# Patient Record
Sex: MALE | Race: WHITE | Employment: UNEMPLOYED | URBAN - METROPOLITAN AREA
[De-identification: names, ages, dates, MRNs, and addresses within clinical notes are randomized per-mention and may not be internally consistent; named-entity substitution may affect disease eponyms.]

---

## 2017-08-22 ENCOUNTER — HOSPITAL ENCOUNTER (OUTPATIENT)
Facility: CLINIC | Age: 52
Setting detail: OBSERVATION
Discharge: HOME | End: 2017-08-24
Attending: EMERGENCY MEDICINE | Admitting: HOSPITALIST
Payer: MEDICAID

## 2017-08-22 DIAGNOSIS — E11.9 NEW ONSET TYPE 2 DIABETES MELLITUS (H): Primary | ICD-10-CM

## 2017-08-22 DIAGNOSIS — R73.9 HYPERGLYCEMIA: ICD-10-CM

## 2017-08-22 LAB
ALBUMIN SERPL-MCNC: 3.7 G/DL (ref 3.4–5)
ALBUMIN UR-MCNC: NEGATIVE MG/DL
ALP SERPL-CCNC: 281 U/L (ref 40–150)
ALT SERPL W P-5'-P-CCNC: 140 U/L (ref 0–70)
ANION GAP SERPL CALCULATED.3IONS-SCNC: 11 MMOL/L (ref 3–14)
ANION GAP SERPL CALCULATED.3IONS-SCNC: 12 MMOL/L (ref 3–14)
APPEARANCE UR: CLEAR
AST SERPL W P-5'-P-CCNC: 71 U/L (ref 0–45)
BASOPHILS # BLD AUTO: 0 10E9/L (ref 0–0.2)
BASOPHILS NFR BLD AUTO: 0.2 %
BILIRUB SERPL-MCNC: 0.5 MG/DL (ref 0.2–1.3)
BILIRUB UR QL STRIP: NEGATIVE
BUN SERPL-MCNC: 8 MG/DL (ref 7–30)
BUN SERPL-MCNC: 9 MG/DL (ref 7–30)
CALCIUM SERPL-MCNC: 7.8 MG/DL (ref 8.5–10.1)
CALCIUM SERPL-MCNC: 9 MG/DL (ref 8.5–10.1)
CHLORIDE SERPL-SCNC: 90 MMOL/L (ref 94–109)
CHLORIDE SERPL-SCNC: 95 MMOL/L (ref 94–109)
CO2 SERPL-SCNC: 24 MMOL/L (ref 20–32)
CO2 SERPL-SCNC: 24 MMOL/L (ref 20–32)
COLOR UR AUTO: ABNORMAL
CREAT SERPL-MCNC: 0.59 MG/DL (ref 0.66–1.25)
CREAT SERPL-MCNC: 0.62 MG/DL (ref 0.66–1.25)
DIFFERENTIAL METHOD BLD: ABNORMAL
EOSINOPHIL # BLD AUTO: 0.1 10E9/L (ref 0–0.7)
EOSINOPHIL NFR BLD AUTO: 0.6 %
ERYTHROCYTE [DISTWIDTH] IN BLOOD BY AUTOMATED COUNT: 12 % (ref 10–15)
GFR SERPL CREATININE-BSD FRML MDRD: >90 ML/MIN/1.7M2
GFR SERPL CREATININE-BSD FRML MDRD: >90 ML/MIN/1.7M2
GLUCOSE BLDC GLUCOMTR-MCNC: 329 MG/DL (ref 70–99)
GLUCOSE BLDC GLUCOMTR-MCNC: 442 MG/DL (ref 70–99)
GLUCOSE BLDC GLUCOMTR-MCNC: >600 MG/DL (ref 70–99)
GLUCOSE SERPL-MCNC: 653 MG/DL (ref 70–99)
GLUCOSE SERPL-MCNC: 678 MG/DL (ref 70–99)
GLUCOSE UR STRIP-MCNC: >1000 MG/DL
HBA1C MFR BLD: >16 % (ref 4.3–6)
HCT VFR BLD AUTO: 38.5 % (ref 40–53)
HGB BLD-MCNC: 13.8 G/DL (ref 13.3–17.7)
HGB UR QL STRIP: NEGATIVE
IMM GRANULOCYTES # BLD: 0 10E9/L (ref 0–0.4)
IMM GRANULOCYTES NFR BLD: 0.5 %
KETONES BLD-SCNC: 1.1 MMOL/L (ref 0–0.6)
KETONES BLD-SCNC: 2.3 MMOL/L (ref 0–0.6)
KETONES BLD-SCNC: NORMAL MMOL/L (ref 0–0.6)
KETONES UR STRIP-MCNC: 10 MG/DL
LEUKOCYTE ESTERASE UR QL STRIP: NEGATIVE
LYMPHOCYTES # BLD AUTO: 2.6 10E9/L (ref 0.8–5.3)
LYMPHOCYTES NFR BLD AUTO: 31.7 %
MCH RBC QN AUTO: 30.9 PG (ref 26.5–33)
MCHC RBC AUTO-ENTMCNC: 35.8 G/DL (ref 31.5–36.5)
MCV RBC AUTO: 86 FL (ref 78–100)
MONOCYTES # BLD AUTO: 0.4 10E9/L (ref 0–1.3)
MONOCYTES NFR BLD AUTO: 4.9 %
MUCOUS THREADS #/AREA URNS LPF: PRESENT /LPF
NEUTROPHILS # BLD AUTO: 5.1 10E9/L (ref 1.6–8.3)
NEUTROPHILS NFR BLD AUTO: 62.1 %
NITRATE UR QL: NEGATIVE
NRBC # BLD AUTO: 0 10*3/UL
NRBC BLD AUTO-RTO: 0 /100
PH UR STRIP: 5.5 PH (ref 5–7)
PLATELET # BLD AUTO: 275 10E9/L (ref 150–450)
POTASSIUM SERPL-SCNC: 3.7 MMOL/L (ref 3.4–5.3)
POTASSIUM SERPL-SCNC: 4.3 MMOL/L (ref 3.4–5.3)
PROT SERPL-MCNC: 7.4 G/DL (ref 6.8–8.8)
RBC # BLD AUTO: 4.46 10E12/L (ref 4.4–5.9)
RBC #/AREA URNS AUTO: <1 /HPF (ref 0–2)
SODIUM SERPL-SCNC: 126 MMOL/L (ref 133–144)
SODIUM SERPL-SCNC: 130 MMOL/L (ref 133–144)
SOURCE: ABNORMAL
SP GR UR STRIP: 1.02 (ref 1–1.03)
SQUAMOUS #/AREA URNS AUTO: <1 /HPF (ref 0–1)
TSH SERPL DL<=0.005 MIU/L-ACNC: 0.95 MU/L (ref 0.4–4)
UROBILINOGEN UR STRIP-MCNC: NORMAL MG/DL (ref 0–2)
WBC # BLD AUTO: 8.2 10E9/L (ref 4–11)
WBC #/AREA URNS AUTO: 3 /HPF (ref 0–2)

## 2017-08-22 PROCEDURE — 96361 HYDRATE IV INFUSION ADD-ON: CPT

## 2017-08-22 PROCEDURE — 85025 COMPLETE CBC W/AUTO DIFF WBC: CPT | Performed by: EMERGENCY MEDICINE

## 2017-08-22 PROCEDURE — 96372 THER/PROPH/DIAG INJ SC/IM: CPT

## 2017-08-22 PROCEDURE — 80048 BASIC METABOLIC PNL TOTAL CA: CPT | Performed by: PHYSICIAN ASSISTANT

## 2017-08-22 PROCEDURE — 80053 COMPREHEN METABOLIC PANEL: CPT | Performed by: EMERGENCY MEDICINE

## 2017-08-22 PROCEDURE — 25000128 H RX IP 250 OP 636: Performed by: EMERGENCY MEDICINE

## 2017-08-22 PROCEDURE — G0378 HOSPITAL OBSERVATION PER HR: HCPCS

## 2017-08-22 PROCEDURE — 83036 HEMOGLOBIN GLYCOSYLATED A1C: CPT | Performed by: EMERGENCY MEDICINE

## 2017-08-22 PROCEDURE — 82010 KETONE BODYS QUAN: CPT | Performed by: EMERGENCY MEDICINE

## 2017-08-22 PROCEDURE — 36415 COLL VENOUS BLD VENIPUNCTURE: CPT | Performed by: PHYSICIAN ASSISTANT

## 2017-08-22 PROCEDURE — 96360 HYDRATION IV INFUSION INIT: CPT

## 2017-08-22 PROCEDURE — 25000131 ZZH RX MED GY IP 250 OP 636 PS 637: Performed by: PHYSICIAN ASSISTANT

## 2017-08-22 PROCEDURE — 99220 ZZC INITIAL OBSERVATION CARE,LEVL III: CPT | Performed by: PHYSICIAN ASSISTANT

## 2017-08-22 PROCEDURE — 84443 ASSAY THYROID STIM HORMONE: CPT | Performed by: EMERGENCY MEDICINE

## 2017-08-22 PROCEDURE — 99285 EMERGENCY DEPT VISIT HI MDM: CPT | Mod: 25

## 2017-08-22 PROCEDURE — 83516 IMMUNOASSAY NONANTIBODY: CPT | Performed by: EMERGENCY MEDICINE

## 2017-08-22 PROCEDURE — 00000146 ZZHCL STATISTIC GLUCOSE BY METER IP

## 2017-08-22 PROCEDURE — 81001 URINALYSIS AUTO W/SCOPE: CPT | Performed by: EMERGENCY MEDICINE

## 2017-08-22 PROCEDURE — 82010 KETONE BODYS QUAN: CPT | Mod: 91 | Performed by: PHYSICIAN ASSISTANT

## 2017-08-22 RX ORDER — DEXTROSE MONOHYDRATE 25 G/50ML
25-50 INJECTION, SOLUTION INTRAVENOUS
Status: DISCONTINUED | OUTPATIENT
Start: 2017-08-22 | End: 2017-08-24 | Stop reason: HOSPADM

## 2017-08-22 RX ORDER — NALOXONE HYDROCHLORIDE 0.4 MG/ML
.1-.4 INJECTION, SOLUTION INTRAMUSCULAR; INTRAVENOUS; SUBCUTANEOUS
Status: DISCONTINUED | OUTPATIENT
Start: 2017-08-22 | End: 2017-08-24 | Stop reason: HOSPADM

## 2017-08-22 RX ORDER — POLYETHYLENE GLYCOL 3350 17 G/17G
17 POWDER, FOR SOLUTION ORAL DAILY PRN
Status: DISCONTINUED | OUTPATIENT
Start: 2017-08-22 | End: 2017-08-24 | Stop reason: HOSPADM

## 2017-08-22 RX ORDER — NICOTINE POLACRILEX 4 MG
15-30 LOZENGE BUCCAL
Status: DISCONTINUED | OUTPATIENT
Start: 2017-08-22 | End: 2017-08-24 | Stop reason: HOSPADM

## 2017-08-22 RX ORDER — ONDANSETRON 2 MG/ML
4 INJECTION INTRAMUSCULAR; INTRAVENOUS EVERY 6 HOURS PRN
Status: DISCONTINUED | OUTPATIENT
Start: 2017-08-22 | End: 2017-08-24 | Stop reason: HOSPADM

## 2017-08-22 RX ORDER — ONDANSETRON 4 MG/1
4 TABLET, ORALLY DISINTEGRATING ORAL EVERY 6 HOURS PRN
Status: DISCONTINUED | OUTPATIENT
Start: 2017-08-22 | End: 2017-08-24 | Stop reason: HOSPADM

## 2017-08-22 RX ORDER — AMOXICILLIN 250 MG
1-2 CAPSULE ORAL 2 TIMES DAILY PRN
Status: DISCONTINUED | OUTPATIENT
Start: 2017-08-22 | End: 2017-08-24 | Stop reason: HOSPADM

## 2017-08-22 RX ORDER — LIDOCAINE 40 MG/G
CREAM TOPICAL
Status: DISCONTINUED | OUTPATIENT
Start: 2017-08-22 | End: 2017-08-24 | Stop reason: HOSPADM

## 2017-08-22 RX ORDER — SODIUM CHLORIDE 9 MG/ML
1000 INJECTION, SOLUTION INTRAVENOUS CONTINUOUS
Status: DISCONTINUED | OUTPATIENT
Start: 2017-08-22 | End: 2017-08-23

## 2017-08-22 RX ADMIN — SODIUM CHLORIDE 1000 ML: 9 INJECTION, SOLUTION INTRAVENOUS at 11:44

## 2017-08-22 RX ADMIN — INSULIN ASPART 8 UNITS: 100 INJECTION, SOLUTION INTRAVENOUS; SUBCUTANEOUS at 18:20

## 2017-08-22 RX ADMIN — INSULIN GLARGINE 10 UNITS: 100 INJECTION, SOLUTION SUBCUTANEOUS at 17:15

## 2017-08-22 RX ADMIN — SODIUM CHLORIDE 1000 ML: 9 INJECTION, SOLUTION INTRAVENOUS at 12:28

## 2017-08-22 RX ADMIN — SODIUM CHLORIDE 1000 ML: 9 INJECTION, SOLUTION INTRAVENOUS at 17:16

## 2017-08-22 ASSESSMENT — ENCOUNTER SYMPTOMS
APPETITE CHANGE: 0
NAUSEA: 0
UNEXPECTED WEIGHT CHANGE: 1
VOMITING: 0
FREQUENCY: 1
DIARRHEA: 0
DYSURIA: 1
ABDOMINAL PAIN: 0
NUMBNESS: 1

## 2017-08-22 NOTE — IP AVS SNAPSHOT
[facility]    [address] [place] [id number]    Phone:  [phone]                                       After Visit Summary   8/22/2017    David Santos    MRN: [id number]           After Visit Summary Signature Page     I have received my discharge instructions, and my questions have been answered. I have discussed any challenges I see with this plan with the nurse or doctor.    ..........................................................................................................................................  Patient/Patient Representative Signature      ..........................................................................................................................................  Patient Representative Print Name and Relationship to Patient    ..................................................               ................................................  Date                                            Time    ..........................................................................................................................................  Reviewed by Signature/Title    ...................................................              ..............................................  Date                                                            Time

## 2017-08-22 NOTE — PHARMACY-ADMISSION MEDICATION HISTORY
Admission medication history interview status for the 8/22/2017  admission is complete. See EPIC admission navigator for prior to admission medications     Medication history source reliability:Good    Actions taken by pharmacist (provider contacted, etc): Spoke with patient     Additional medication history information not noted on PTA med list :None    Medication reconciliation/reorder completed by provider prior to medication history? Yes    Time spent in this activity: 5 minutes    Prior to Admission medications    Not on File

## 2017-08-22 NOTE — ED NOTES
[facility]  ED Nurse Handoff Report    ED Chief complaint: Weight Loss (drastic weight loss (50lbs+) over the last 3 months. patient states he doesn't have any specific symptoms and is eating food. )      ED Diagnosis:   Final diagnoses:   None       Code Status: Full Code    Allergies:   Allergies   Allergen Reactions     Vicodin [Hydrocodone-Acetaminophen] Nausea and Vomiting     Vomiting and sweats       Activity level - Baseline/Home:  Independent    Activity Level - Current:   Independent     Needed?: No    Isolation: No  Infection: Not Applicable    Bariatric?: No    Vital Signs:   Vitals:    08/22/17 1009   BP: 133/86   Pulse: 77   Resp: 14   Temp: 97.9  F (36.6  C)   TempSrc: Oral   SpO2: 99%   Weight: 83.2 kg (183 lb 6.4 oz)   Height: 1.829 m (6')       Cardiac Rhythm: ,        Pain level:      Is this patient confused?: No    Patient Report: Initial Complaint: weight loss  Focused Assessment: pt arrived to ED today with friend who has not seen pt for past few months as 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 Friend was concerned about pt weight loss that seemed a little drastic to friend. Pt has no other symptoms except numbness to the bottom of the left foot and voiding frequently. Labs were drawn, with glucose of over 600 and A1C of greater than 16.0. Pt admits to drinking, using meth and smoking marijuana.   Tests Performed: labs  Abnormal Results: see lab results, elevated glucose, ketones, A1C  Treatments provided: fluids 2L    Family Comments: none, friend went to work    OBS brochure/video discussed/provided to patient: No    ED Medications:   Medications   0.9% sodium chloride infusion (0 mLs Intravenous Stopped 8/22/17 1311)   0.9% sodium chloride BOLUS (0 mLs Intravenous Stopped 8/22/17 1226)       Drips infusing?:  No      ED NURSE PHONE NUMBER: [phone]

## 2017-08-22 NOTE — IP AVS SNAPSHOT
MRN:[id number]                      After Visit Summary   8/22/2017    David Santos    MRN: [id number]           Thank you!     Thank you for choosing [facility] for your care. Our goal is always to provide you with excellent care. Hearing back from our patients is one way we can continue to improve our services. Please take a few minutes to complete the written survey that you may receive in the mail after you visit with us. Thank you!        Patient Information     Date Of Birth          1965        About your hospital stay     You were admitted on:  August 22, 2017 You last received care in the:  [facility]    You were discharged on:  August 24, 2017        Reason for your hospital stay       You were here for evaluation of weight loss. Your blood sugar was found to be significantly elevated leading to a new diagnosis of diabetes.                  Who to Call     For medical emergencies, please call 911.  For non-urgent questions about your medical care, please call your primary care provider or clinic, [phone]          Attending Provider     Provider Specialty    Fabiano Osorio MD --    Tapan Marcum MD Internal Medicine       Primary Care Provider Office Phone # Fax #    Bella Isaac [phone]       When to contact your care team       Call your primary doctor if you have any of the following: blood sugar >500                  After Care Instructions     Activity       Your activity upon discharge: activity as tolerated            Diet       Follow this diet upon discharge: Orders Placed This Encounter      Moderate Consistent CHO Diet            Discharge Instructions       Continue the insulin regimen we started in the hospital. It is extremely important that you check your blood sugars four times daily and keep a log of this for your doctors. Check sugars prior to meals.     You will be sent home with glucose gel to be used if  your blood sugar drops too low    Please try and adhere to a moderate carbohydrate diet. Avoid simple sugars like candy, baked goods, soda as these can significantly raise your blood sugar     You should not take mealtime insulin unless you are eating                  Follow-up Appointments     Follow-up and recommended labs and tests        Follow up with Endocrinology (diabetes specialist) 8/25  Establish care with a new primary care provider next week as scheduled  Follow up with diabetes educator next week                  Your next 10 appointments already scheduled     Aug 25, 2017  1:00 PM CDT   (Arrive by 12:45 PM)   RETURN DIABETES with Chary Britt MD   [facility] ([facility])    [address]  3rd Floor  [facility] [address] [phone]            Aug 28, 2017 10:30 AM CDT   [facility]    [address] [address] [phone]           Bring a current list of meds and any records pertaining to this visit. For Physicals, please bring immunization records and any forms needing to be filled out. Please arrive 10 minutes early to complete paperwork.            Sep 05, 2017  9:00 AM CDT   Diabetic Education with  DIABETIC ED RESOURCE   [facility]    [address] [place] [id number] [phone]              Additional Services     Diabetes Educator Referral       DIABETES SELF MANAGEMENT TRAINING (DSMT)      Your provider has referred you to Diabetes Education: [facility]: Diabetes Education - All [facility] [phone]   [email or web address]    Type of training and number of hours: New Diagnosis: Initial group DSMT - 10 hours.      Medicare covers: 10 hours of initial DSMT in 12 month period from the time of first  visit, plus 2 hours of follow-up DSMT annually, and additional hours as requested for insulin training.    Diabetes Type: Type 2 - On Insulin             Diabetes Co-Morbidities: hypoglycemia unawareness               A1C Goal:  <8.0       A1C is: Lab Results       Component                Value               Date                       A1C                      >16.0               08/22/2017              If an urgent visit is needed or A1C is above 12, Care Team to call the Diabetes Education Team at [phone] or send an In Basket message to the Diabetes Education Pool (P DIAB ED-PATIENT CARE).    Diabetes Education Topics: Comprehensive Knowledge Assessment and Instruction, Knowledge: Healthy Eating and Monitoring Blood Sugar, Blood glucose meter instruction  and Medication Start: Insulin:Special Educational Needs Requiring Individual DSMT: None       MEDICAL NUTRITION THERAPY (MNT) for Diabetes    Medical Nutrition Therapy with a Registered Dietitian can be provided in coordination with Diabetes Self-Management Training to assist in achieving optimal diabetes management.         Please be aware that coverage of these services is subject to the terms and limitations of your health insurance plan.  Call member services at your health plan to determine Diabetes Self-Management Training benefits and ask which blood glucose monitor brands are covered by your plan.      Please bring the following with you to your appointment:    (1)  List of current medications   (2)  List of Blood Glucose Monitor brands that are covered by your insurance plan  (3)  Blood Glucose Monitor and log book  (4)   Food records for the 3 days prior to your visit    The Certified Diabetes Educator may make diabetes medication adjustments per the CDE Protocol and Collaborative Practice Agreement.                  Pending Results     Date and Time Order Name Status Description    8/22/2017 1025 Glutamic acid decarboxylase antibody In process   "           Statement of Approval     Ordered          17 1015  I have reviewed and agree with all the recommendations and orders detailed in this document.  EFFECTIVE NOW     Approved and electronically signed by:  Maryana Varghese PA-C             Admission Information     Date & Time Provider Department Dept. Phone    2017 Tapan Marcum MD [facility] [phone]      Your Vitals Were     Blood Pressure Pulse Temperature Respirations Height Weight    126/71 (BP Location: Right arm) 77 97.8  F (36.6  C) (Oral) 16 1.829 m (6' 0.01\") 83 kg (182 lb 14.4 oz)    Pulse Oximetry BMI (Body Mass Index)                99% 24.8 kg/m2          [facility] Information     [facility] lets you send messages to your doctor, view your test results, renew your prescriptions, schedule appointments and more. To sign up, go to [email or web address] . Click on \"Log in\" on the left side of the screen, which will take you to the Welcome page. Then click on \"Sign up Now\" on the right side of the page.     You will be asked to enter the access code listed below, as well as some personal information. Please follow the directions to create your username and password.     Your access code is: [email or web address]  Expires: 2017 12:46 PM     Your access code will  in 90 days. If you need help or a new code, please call your [place] clinic or [phone].        Care EveryWhere ID     This is your Care EveryWhere ID. This could be used by other organizations to access your [place] medical records  [id number]        Equal Access to Services     [facility] : Hadluiz Cavazos, clinton ayala, raji arango. So [facility] [phone].    ATENCIÓN: Si habla español, tiene a maldonado disposición servicios gratuitos de asistencia lingüística. Llame al [phone].    We comply with applicable federal civil rights laws and [place] laws. We do not " discriminate on the basis of race, color, national origin, age, disability sex, sexual orientation or gender identity.               Review of your medicines      START taking        Dose / Directions    blood glucose calibration solution   Commonly known as:  no brand specified        Use to calibrate blood glucose monitor as directed.   Quantity:  1 each   Refills:  0       blood glucose lancets standard   Commonly known as:  no brand specified        Use to test blood sugar 4 times daily or as directed.   Quantity:  100 each   Refills:  11       blood glucose monitoring meter device kit        Use to test blood sugars 4 times daily or as directed.   Quantity:  1 kit   Refills:  0       blood glucose monitoring test strip   Commonly known as:  no brand specified        Use to test blood sugars 4 times daily or as directed   Quantity:  100 strip   Refills:  0       glucose 40 % Gel gel        Dose:  15-30 g   Take 15-30 g by mouth every 15 minutes as needed for low blood sugar   Quantity:  1 Tube   Refills:  0       * insulin aspart 100 UNIT/ML injection   Commonly known as:  NovoLOG PEN        Dose:  1-10 Units   Inject 1-10 Units Subcutaneous 3 times daily (before meals)   Quantity:  3 mL   Refills:  0       * insulin aspart 100 UNIT/ML injection   Commonly known as:  NovoLOG PEN        Dose:  1-7 Units   Inject 1-7 Units Subcutaneous At Bedtime   Quantity:  3 mL   Refills:  0       * insulin aspart 100 UNIT/ML injection   Commonly known as:  NovoLOG PEN        Dose:  7 Units   Inject 7 Units Subcutaneous 3 times daily (with meals)   Quantity:  3 mL   Refills:  0       insulin glargine 100 UNIT/ML injection   Commonly known as:  LANTUS        Dose:  20 Units   Inject 20 Units Subcutaneous At Bedtime   Quantity:  3 mL   Refills:  0       * Notice:  This list has 3 medication(s) that are the same as other medications prescribed for you. Read the directions carefully, and ask your doctor or other care provider to  review them with you.         Where to get your medicines      These medications were sent to [facility] [place], [place] - [address]  [address] [place] [address]     Phone:  [phone]     blood glucose calibration solution    blood glucose lancets standard    blood glucose monitoring meter device kit    blood glucose monitoring test strip    glucose 40 % Gel gel    insulin aspart 100 UNIT/ML injection    insulin aspart 100 UNIT/ML injection    insulin aspart 100 UNIT/ML injection    insulin glargine 100 UNIT/ML injection                Protect others around you: Learn how to safely use, store and throw away your medicines at www.disposemymeds.org.             Medication List: This is a list of all your medications and when to take them. Check marks below indicate your daily home schedule. Keep this list as a reference.      Medications           Morning Afternoon Evening Bedtime As Needed    blood glucose calibration solution   Commonly known as:  no brand specified   Use to calibrate blood glucose monitor as directed.   Next Dose Due:  As directed                                blood glucose lancets standard   Commonly known as:  no brand specified   Use to test blood sugar 4 times daily or as directed.   Next Dose Due:  At dinner and at bedtime today                                            blood glucose monitoring meter device kit   Use to test blood sugars 4 times daily or as directed.   Next Dose Due:  At dinner and at bedtime tonight                                            blood glucose monitoring test strip   Commonly known as:  no brand specified   Use to test blood sugars 4 times daily or as directed   Next Dose Due:  At dinner and at bedtime tonight                                            glucose 40 % Gel gel   Take 15-30 g by mouth every 15 minutes as needed for low blood sugar   Next Dose Due:  When blood sugar < 60 or you have symptoms of low blood sugar                                 * insulin aspart 100 UNIT/ML injection   Commonly known as:  NovoLOG PEN   Inject 1-10 Units Subcutaneous 3 times daily (before meals)   Last time this was given:  17 Units on 8/24/2017 12:53 PM                                * insulin aspart 100 UNIT/ML injection   Commonly known as:  NovoLOG PEN   Inject 1-7 Units Subcutaneous At Bedtime   Last time this was given:  17 Units on 8/24/2017 12:53 PM                                * insulin aspart 100 UNIT/ML injection   Commonly known as:  NovoLOG PEN   Inject 7 Units Subcutaneous 3 times daily (with meals)   Last time this was given:  17 Units on 8/24/2017 12:53 PM                                insulin glargine 100 UNIT/ML injection   Commonly known as:  LANTUS   Inject 20 Units Subcutaneous At Bedtime   Last time this was given:  20 Units on 8/23/2017 10:33 PM   Next Dose Due:  At bedtime tonight                                 * Notice:  This list has 3 medication(s) that are the same as other medications prescribed for you. Read the directions carefully, and ask your doctor or other care provider to review them with you.

## 2017-08-22 NOTE — PLAN OF CARE
Observation goals PRIOR TO DISCHARGE     Comments: Insulin regimen in place:  Partially met  Blood sugars controlled: Not met  Follow up in place: Not met   Blood sugar> 300, education initiated, DM education pamphlet provided.  Continue to monitor closely

## 2017-08-22 NOTE — H&P
PRIMARY CARE PHYSICIAN:  None given.      DATE OF SERVICE:  08/22/2017      CHIEF COMPLAINT:  Weight loss.      HISTORY OF PRESENT ILLNESS:  David Santos is a 52-year-old male with a past medical history significant for tobacco use and history of elevated LFTs who presented to the Emergency Department today for evaluation of weight loss.  The patient reports that, over the past 3 months, his weight has dropped from 240 pounds to 184 pounds, though his diet has not changed much.  His friends are becoming very concerned about his weight and encouraged him to seek medical attention.  He reports, that over the past 3 months, he has been experiencing fatigue, polyuria and polydipsia.  He also notes bilateral numbness and tingling in his feet.  He was evaluated in the Emergency Department by Dr. Osorio.  His blood sugar was found to be markedly elevated at 653.  Hemoglobin A1c was checked and found to be greater than 16.  Additionally, his laboratory evaluation was notable for a sodium of 126 which corrects to 133, as well as elevated LFTs.  He was given a 2 liter fluid bolus and admission for new onset diabetes was requested.      The patient is presently evaluated in the Emergency Department by me.  He reports he is feeling okay at the time of my exam.  Again, his primary reason for coming in was for evaluation of weight loss.  He denies any chest pain, shortness of breath, nausea, vomiting or abdominal pain.  He denies any recent bleeding or bruising.  Denies recent illnesses or injuries.  He does note he has had significant thirst as well as polyuria over the past couple of months.  He has not seen a medical provider in over 10 years.      REVIEW OF SYSTEMS:  A 10-point review of systems was conducted and is negative aside from the information in the HPI.      PAST MEDICAL HISTORY:   1.  Tobacco use.   2.  History of methamphetamine and marijuana abuse.   3.  History of colitis.   4.  History of elevated LFTs  noted in documentation in 2007.      PAST SURGICAL HISTORY:  Reviewed and not contributory.      ALLERGIES:  Vicodin.      SOCIAL HISTORY:  Patient has a history of heavy alcohol use.  Reports that, until about 3 months ago, he was drinking 1-1.5 bottles of liquor (1.75 liters) per week.  He has cut down over the past couple of months.  Reports that he drinks 4-5 beers a couple times a week.  Denies being a nightly drinker.  He denies any history of alcohol withdrawal or seizure.  He has a history of drug abuse including marijuana and meth.  Denies any IV drug use.  His last methamphetamine use was approximately 1 month prior.  He is a current smoker and is currently using a vaporizer.  He has been smoking on and off, about a pack a day, for 30 years.  He is employed intermittently as a .      FAMILY HISTORY:  Sister has history of type 2 diabetes and his brother has a history of heart disease with an MI in his 40s.      LABORATORY DATA:  Sodium 126, potassium 4.3, chloride 90, CO2 24, BUN 9, creatinine 0.59, total bili 0.5, alk phos 281,  and AST is 71.  His hemoglobin A1c is greater than 16.  TSH normal at 0.95.  Ketones are elevated at 2.3 and initial blood sugar was 653.  Subsequent check at 442.  His white blood cell count is 8.2, hemoglobin 13.8, hematocrit 38.5 and platelets are 275.  Urinalysis is notable for glucose, no evidence of infection.      PHYSICAL EXAMINATION:   VITAL SIGNS:  Temperature 97.9, heart rate 77, blood pressure 133/86, respiratory rate 14, oxygen saturation is 99% on room air.   GENERAL:  Alert and oriented male sitting up in bed, appears comfortable and is appropriately conversant, flat affect.   HEENT:  Eyes:  Pupils are equal and reactive to light, EOMI.   ENT:  Mucous membranes are moist, poor dentition.   CARDIOVASCULAR:  Regular rate and rhythm, no murmurs appreciated.   RESPIRATORY:  Lungs are clear to auscultation bilaterally.  No increased work of  breathing or wheezing.   GASTROINTESTINAL:  Positive bowel sounds.  Abdomen with mild tenderness to palpation diffusely.   SKIN:  Warm and dry.   NEUROLOGIC:  Cranial nerves II-XII are grossly intact.  No focal deficits.      ASSESSMENT:  David Santos is a 52-year-old male with a past medical history significant for tobacco use, substance abuse and a history of elevated liver function tests who presented to the Emergency Department for evaluation of weight loss and fatigue.  He is being admitted under observation status for further evaluation and treatment of new onset type 2 diabetes mellitus.   1.  New onset type 2 diabetes mellitus.  Patient had not been seen by a medical provider in over 10 years.  He reports 3 months of significant weight loss, fatigue, increased urination and polydipsia.  His blood sugar is elevated on admission at 653 with an elevated A1c of greater than 16.  Notably, his anion gap was within normal limits so he is not in diabetic ketoacidosis.  Ketones are mildly elevated at 2.3.  A long discussion had with patient regarding the diagnosis of diabetes and initiation of insulin, he is agreeable to try this and agreeable to follow up.   -- Will start Lantus 10 units once daily.   -- Medium intensity sliding scale insulin, patient should be checking his own blood sugar, administering insulin injections.   -- Moderate carbohydrate diet.   -- Care coordinator consult to facilitate primary care physician appointment to establish care as well as Endocrinology appointment.  Patient will eventually need a complete foot exam and    Ophthalmology examination.     -- Arrange diabetic education as an outpatient.   3.  Elevated liver function tests, unclear chronicity.  The patient has not been seen by a medical provider since 2007, though his transaminases were elevated back in 2007 as k  Denies intravenous drug use.   -- Will obtain abdominal ultrasound.   -- Will need further followup as an outpatient.    4.  Code Status:  Patient is full code.      DISPOSITION:  Anticipate discharge tomorrow when blood sugar is under better control.  Outpatient followup, as arranged, and insulin regimen in place.      Patient was seen and examined with Dr. Sharon Marcum who agrees with the above plan.         SHARON MARCUM MD       As dictated by TIFFANI SHELBY PA-C            D: 2017 18:07   T: 2017 18:46   MT: TD      Name:     VÍCTOR QUEZADA   MRN:      [id number]        Account:      [id number]   :      1965           Admitted:     [id number]      Document: [id number]

## 2017-08-23 ENCOUNTER — APPOINTMENT (OUTPATIENT)
Dept: ULTRASOUND IMAGING | Facility: CLINIC | Age: 52
End: 2017-08-23
Attending: PHYSICIAN ASSISTANT
Payer: MEDICAID

## 2017-08-23 LAB
ALBUMIN SERPL-MCNC: 2.8 G/DL (ref 3.4–5)
ALP SERPL-CCNC: 212 U/L (ref 40–150)
ALT SERPL W P-5'-P-CCNC: 113 U/L (ref 0–70)
ANION GAP SERPL CALCULATED.3IONS-SCNC: 7 MMOL/L (ref 3–14)
AST SERPL W P-5'-P-CCNC: 64 U/L (ref 0–45)
BILIRUB SERPL-MCNC: 0.5 MG/DL (ref 0.2–1.3)
BUN SERPL-MCNC: 7 MG/DL (ref 7–30)
CALCIUM SERPL-MCNC: 8 MG/DL (ref 8.5–10.1)
CHLORIDE SERPL-SCNC: 103 MMOL/L (ref 94–109)
CO2 SERPL-SCNC: 26 MMOL/L (ref 20–32)
CREAT SERPL-MCNC: 0.68 MG/DL (ref 0.66–1.25)
GFR SERPL CREATININE-BSD FRML MDRD: >90 ML/MIN/1.7M2
GLUCOSE BLDC GLUCOMTR-MCNC: 250 MG/DL (ref 70–99)
GLUCOSE BLDC GLUCOMTR-MCNC: 299 MG/DL (ref 70–99)
GLUCOSE BLDC GLUCOMTR-MCNC: 340 MG/DL (ref 70–99)
GLUCOSE BLDC GLUCOMTR-MCNC: 422 MG/DL (ref 70–99)
GLUCOSE BLDC GLUCOMTR-MCNC: 464 MG/DL (ref 70–99)
GLUCOSE SERPL-MCNC: 314 MG/DL (ref 70–99)
POTASSIUM SERPL-SCNC: 3.6 MMOL/L (ref 3.4–5.3)
PROT SERPL-MCNC: 5.9 G/DL (ref 6.8–8.8)
SODIUM SERPL-SCNC: 136 MMOL/L (ref 133–144)

## 2017-08-23 PROCEDURE — 25000128 H RX IP 250 OP 636: Performed by: EMERGENCY MEDICINE

## 2017-08-23 PROCEDURE — 96372 THER/PROPH/DIAG INJ SC/IM: CPT | Mod: 59

## 2017-08-23 PROCEDURE — 36415 COLL VENOUS BLD VENIPUNCTURE: CPT | Performed by: PHYSICIAN ASSISTANT

## 2017-08-23 PROCEDURE — 00000146 ZZHCL STATISTIC GLUCOSE BY METER IP

## 2017-08-23 PROCEDURE — 25000131 ZZH RX MED GY IP 250 OP 636 PS 637: Performed by: PHYSICIAN ASSISTANT

## 2017-08-23 PROCEDURE — 76700 US EXAM ABDOM COMPLETE: CPT

## 2017-08-23 PROCEDURE — 80053 COMPREHEN METABOLIC PANEL: CPT | Performed by: PHYSICIAN ASSISTANT

## 2017-08-23 PROCEDURE — 99226 ZZC SUBSEQUENT OBSERVATION CARE,LEVEL III: CPT | Performed by: PHYSICIAN ASSISTANT

## 2017-08-23 PROCEDURE — G0378 HOSPITAL OBSERVATION PER HR: HCPCS

## 2017-08-23 PROCEDURE — 96361 HYDRATE IV INFUSION ADD-ON: CPT

## 2017-08-23 RX ORDER — BLOOD-GLUCOSE METER
EACH MISCELLANEOUS
Qty: 1 KIT | Refills: 0 | Status: SHIPPED | OUTPATIENT
Start: 2017-08-23

## 2017-08-23 RX ORDER — BLOOD-GLUCOSE CONTROL, NORMAL
EACH MISCELLANEOUS
Qty: 1 EACH | Refills: 0 | Status: SHIPPED | OUTPATIENT
Start: 2017-08-23 | End: 2017-10-04

## 2017-08-23 RX ADMIN — INSULIN ASPART 9 UNITS: 100 INJECTION, SOLUTION INTRAVENOUS; SUBCUTANEOUS at 08:43

## 2017-08-23 RX ADMIN — INSULIN GLARGINE 20 UNITS: 100 INJECTION, SOLUTION SUBCUTANEOUS at 22:33

## 2017-08-23 RX ADMIN — INSULIN ASPART 5 UNITS: 100 INJECTION, SOLUTION INTRAVENOUS; SUBCUTANEOUS at 11:59

## 2017-08-23 RX ADMIN — SODIUM CHLORIDE 1000 ML: 9 INJECTION, SOLUTION INTRAVENOUS at 01:10

## 2017-08-23 RX ADMIN — INSULIN ASPART 10 UNITS: 100 INJECTION, SOLUTION INTRAVENOUS; SUBCUTANEOUS at 16:34

## 2017-08-23 NOTE — PLAN OF CARE
Observation goals PRIOR TO DISCHARGE     Comments: Insulin regimen in place:  Partially met  Blood sugars controlled: Not met  Follow up in place: Not met   Blood sugar> 600.  7 Unit of Novolog insulin was given, Naeem Alcantara was notified.  Stat BMP, 10 units of Novolog given in addition given, recheck blood sugar at 0200. Pt is totally asymptomatic except polyuria.  Report to night RN to continue to monitor closely.  Pt admitted that he did drink 1/2 bottle of diet mountain dew from vending machine.  Instructed pt about his diet restriction.

## 2017-08-23 NOTE — PLAN OF CARE
Problem: Individualization  Goal: Patient Preferences  Outcome: No Change  Observation Goals  Insulin regimen in place -no  Blood sugars controlled -no  Follow up in place-no

## 2017-08-23 NOTE — PROVIDER NOTIFICATION
MD Notification    Notified Person:  MD    Notified Persons Name: Maryana Justin    Notification Date/Time: 8/23/17 1612    Notification Interaction:  Talked with Physician    Purpose of Notification:     Orders Received: will be started on mealtime coverage along with SSI    Comments:

## 2017-08-23 NOTE — PLAN OF CARE
Problem: Goal Outcome Summary  Goal: Goal Outcome Summary  Outcome: No Change  PRIMARY DIAGNOSIS: HYPO/HYPERGLYCEMIA     OUTPATIENT/OBSERVATION GOALS TO BE MET BEFORE DISCHARGE  1. BG greater than 100 and less than 250 on two consecutive readings: No  Recent Labs   Lab Test  08/23/17   0821  08/23/17   0216  08/22/17 2123   BGM  340*  299*  >600*          2. Ketones absent from urine  (hyperglycemia): No      3. Tolerating oral intake to maintain hydration: Yes      4. Return to near baseline physical activity: Yes     Discharge Planner Nurse   Safe discharge environment identified: Yes  Barriers to discharge: Yes       Entered by: Venice Osborne 08/23/2017 10:24 AM     Please review provider order for any additional goals.   Nurse to notify provider when observation goals have been met and patient is ready for discharge.

## 2017-08-23 NOTE — PROGRESS NOTES
[facility]    Hospitalist Progress Note      Assessment & Plan   David Santos is a 52 year old male with a past medical history significant for tobacco use, substance abuse and a history of elevated liver function tests who presented to the Emergency Department for evaluation of weight loss and fatigue.  He is being admitted under observation status for further evaluation and treatment of new onset type 2 diabetes mellitus.     1.  New onset type 2 diabetes mellitus.  Patient had not been seen by a medical provider in over 10 years.  He reports 3 months of significant weight loss, fatigue, increased urination and polydipsia.  His blood sugar was elevated on admission at 653 with an elevated A1c of greater than 16.  Notably, his anion gap was within normal limits so he was not in diabetic ketoacidosis.  A long discussion had with patient regarding the diagnosis of diabetes and initiation of insulin, he is agreeable to try this and agreeable to follow up. Blood sugar this morning improving, 340 am, 250 before lunch.   -- Increase Lantus to 20  -- High intensity sliding scale; patient should check his own sugars and administer his own injections  -- 5m NovoLog with meals added 8/23  -- Moderate carbohydrate diet.   -- PCP appointment has been made to establish care on 8/28 and appointment with diabetes educator later that week.     2.  Elevated liver function tests, unclear chronicity.  The patient has not been seen by a medical provider since 2007, though his transaminases were elevated back in 2007.  Denies intravenous drug use.   -- abdominal ultrasound unremarkable  -- should be rechecked in follow up, consider hepatitis testing    DVT Prophylaxis: Ambulate every shift  Code Status: Full Code    Disposition: Expected discharge 8/24    This patient was seen and examined with Dr. Lr who agrees with the above plan.    HARSH Vo-ARSALAN    Interval History   Patient is feeling well today.  Some mild lower diffuse abdominal discomfort this morning which has since resolved. Denies CP, SOB. Feels comfortable with glucose. Reports last night he made a trip to the vending machine for junk food before his 600+ blood sugar.     -Data reviewed today: I reviewed all new labs and imaging results over the last 24 hours. I personally reviewed no images or EKG's today.    Physical Exam   Temp: 98  F (36.7  C) Temp src: Oral BP: 122/72   Heart Rate: 59 Resp: 16 SpO2: 98 % O2 Device: None (Room air)    Vitals:    08/22/17 1009 08/22/17 1603   Weight: 83.2 kg (183 lb 6.4 oz) 83 kg (182 lb 14.4 oz)     Vital Signs with Ranges  Temp:  [98  F (36.7  C)-98.9  F (37.2  C)] 98  F (36.7  C)  Heart Rate:  [59-79] 59  Resp:  [16] 16  BP: (122-130)/(66-81) 122/72  SpO2:  [97 %-98 %] 98 %  I/O last 3 completed shifts:  In: 1625.83 [P.O.:880; I.V.:745.83]  Out: 2520 [Urine:2520]    GENERAL:  Alert and oriented male sitting up in bed, appears comfortable and is appropriately conversant, flat affect.   HEENT:  Eyes:  Pupils are equal and reactive to light, EOMI.   ENT:  Mucous membranes are moist, poor dentition.   CARDIOVASCULAR:  Regular rate and rhythm, no murmurs appreciated.   RESPIRATORY:  Lungs are clear to auscultation bilaterally.  No increased work of breathing or wheezing.   GASTROINTESTINAL:  Positive bowel sounds.  Abdomen with mild tenderness to palpation diffusely.   SKIN:  Warm and dry.   NEUROLOGIC:  Cranial nerves II-XII are grossly intact.  No focal deficits.      Medications     NaCl 1,000 mL (08/23/17 0110)       insulin aspart  5 Units Subcutaneous QAM AC     insulin glargine  15 Units Subcutaneous At Bedtime     sodium chloride (PF)  3 mL Intracatheter Q8H     insulin aspart  1-10 Units Subcutaneous TID AC     insulin aspart  1-7 Units Subcutaneous At Bedtime       Data     Recent Labs  Lab 08/23/17  0636 08/22/17  2145 08/22/17  1025   WBC  --   --  8.2   HGB  --   --  13.8   MCV  --   --  86   PLT  --   --   275    130* 126*   POTASSIUM 3.6 3.7 4.3   CHLORIDE 103 95 90*   CO2 26 24 24   BUN 7 8 9   CR 0.68 0.62* 0.59*   ANIONGAP 7 11 12   JAMIA 8.0* 7.8* 9.0   * 678* 653*   ALBUMIN 2.8*  --  3.7   PROTTOTAL 5.9*  --  7.4   BILITOTAL 0.5  --  0.5   ALKPHOS 212*  --  281*   *  --  140*   AST 64*  --  71*       Recent Results (from the past 24 hour(s))   US Abdomen Complete    Narrative    ULTRASOUND  ABDOMEN COMPLETE   8/23/2017 7:58 AM     HISTORY: Elevated liver function test.    COMPARISON: CT 5/30/2007    FINDINGS: Visualized portions of the pancreas, aorta, and IVC are  unremarkable. The liver is normal in size and echogenicity. No biliary  dilatation or liver mass. The gallbladder is normal. No gallstones.  The common bile duct measures 3 mm. Right kidney measures 13.7 cm in  length and appears normal. The spleen is normal in size and  appearance. The left kidney measures 14.1 cm in length. There is an  exophytic cyst arising from the upper pole of the left kidney that  measures up to 3.9 cm in diameter.      Impression    IMPRESSION: The liver and gallbladder appear normal.    RONNY APODACA MD

## 2017-08-23 NOTE — PLAN OF CARE
Problem: Goal Outcome Summary  Goal: Goal Outcome Summary  Outcome: Improving  PRIMARY DIAGNOSIS: HYPO/HYPERGLYCEMIA     OUTPATIENT/OBSERVATION GOALS TO BE MET BEFORE DISCHARGE  1. BG greater than 100 and less than 250 on two consecutive readings: No  Recent Labs   Lab Test  08/23/17   1144  08/23/17   0821  08/23/17   0216   BGM  250*  340*  299*          2. Ketones absent from urine  (hyperglycemia): Yes      3. Tolerating oral intake to maintain hydration: Yes      4. Return to near baseline physical activity: Yes     Discharge Planner Nurse   Safe discharge environment identified: Yes  Barriers to discharge: No       Entered by: Venice Osborne 08/23/2017 2:56 PM     Please review provider order for any additional goals.   Nurse to notify provider when observation goals have been met and patient is ready for discharge.

## 2017-08-23 NOTE — PLAN OF CARE
Problem: Individualization  Goal: Patient Preferences  Outcome: No Change  Observation Goals  Insulin regimen in place -no  Blood sugars controlled -no  Follow up in place-no  Pt A&O, VSS, pt up independently. BS at 0200 was 299.  Pt needs more DM education.  Pt in denial about being a diabetic.  Will continue to monitor.

## 2017-08-23 NOTE — PROVIDER NOTIFICATION
MD Notification    Notified Person:  MD    Notified Persons Name:    Notification Date/Time:2130    Notification Interaction:  Talked with Physician/HARSH Alcantara    Purpose of Notification: Blood sugar >600, 7 Unit of Novolog insulin given    Orders Received: Stat BMP obtained, Blood sugar 678, 10 Unit of Novolog insulin is ordered and given.  Recheck at 0200    Comments:

## 2017-08-23 NOTE — PROGRESS NOTES
hospitalist cross-cover: paged regarding bedside finger blood glucose value of >600, stat BMP drawn with glucose 678, remains unremarkable without anion gap suggestive of DKA. Will administer additional 10u novolog insulin and check glucose values q4h overnight.    Naeem Alcantara PA-C  8/22/2017, 10:20 PM  Pager: [phone]

## 2017-08-24 VITALS
TEMPERATURE: 97.8 F | RESPIRATION RATE: 16 BRPM | HEART RATE: 77 BPM | WEIGHT: 182.9 LBS | OXYGEN SATURATION: 99 % | DIASTOLIC BLOOD PRESSURE: 71 MMHG | HEIGHT: 72 IN | SYSTOLIC BLOOD PRESSURE: 126 MMHG | BODY MASS INDEX: 24.77 KG/M2

## 2017-08-24 LAB
GAD65 AB SER IA-ACNC: <5 IU/ML (ref 0–5)
GLUCOSE BLDC GLUCOMTR-MCNC: 276 MG/DL (ref 70–99)
GLUCOSE BLDC GLUCOMTR-MCNC: 321 MG/DL (ref 70–99)
GLUCOSE BLDC GLUCOMTR-MCNC: 397 MG/DL (ref 70–99)

## 2017-08-24 PROCEDURE — 99207 ZZC CDG-CODE INCORRECT PER BILLING BASED ON TIME: CPT | Performed by: PHYSICIAN ASSISTANT

## 2017-08-24 PROCEDURE — 00000146 ZZHCL STATISTIC GLUCOSE BY METER IP

## 2017-08-24 PROCEDURE — 96372 THER/PROPH/DIAG INJ SC/IM: CPT

## 2017-08-24 PROCEDURE — G0378 HOSPITAL OBSERVATION PER HR: HCPCS

## 2017-08-24 PROCEDURE — 99217 ZZC OBSERVATION CARE DISCHARGE: CPT | Performed by: PHYSICIAN ASSISTANT

## 2017-08-24 RX ORDER — NICOTINE POLACRILEX 4 MG
15-30 LOZENGE BUCCAL
Qty: 1 TUBE | Refills: 0 | Status: SHIPPED | OUTPATIENT
Start: 2017-08-24

## 2017-08-24 RX ADMIN — INSULIN ASPART 6 UNITS: 100 INJECTION, SOLUTION INTRAVENOUS; SUBCUTANEOUS at 08:56

## 2017-08-24 RX ADMIN — INSULIN ASPART 10 UNITS: 100 INJECTION, SOLUTION INTRAVENOUS; SUBCUTANEOUS at 12:53

## 2017-08-24 NOTE — PLAN OF CARE
Problem: Discharge Planning  Goal: Discharge Planning (Adult, OB, Behavioral, Peds)  Outcome: No Change   PRIOR TO DISCHARGE     Comments: Insulin regimen in place working on goal  Blood sugars controlled not met HS   Follow up in place: Goal met

## 2017-08-24 NOTE — PROGRESS NOTES
Care Coordinator met wt pt to discuss DC planning and F/U appts.  Pt's [place] health [place] was just [place]; his reference # is [id number]. Pt's goal is to DC home.  He states he lives wt his friends Ralph and Johana.  He also has a goof friend Monty who is very helpful and supportive; Monty will pick him up today and take him home.  Pt is a new diabetic.  He has appts wt Endocrinology tomorrow and Diabetic nurse educator Sept. 5th.  He will also F/U wt his new PCP on 8/28.  Pt is aware of these appts and states he will go to his appts.  Pt has diabetic supplies to take home today.  He states he's had diabetic/ insulin self injection teaching here at the hospital.  He states he has no other concerns at DC.  Pt to Dc today.

## 2017-08-24 NOTE — PLAN OF CARE
Problem: Goal Outcome Summary  Goal: Goal Outcome Summary  Outcome: Improving  PRIMARY DIAGNOSIS: HYPO/HYPERGLYCEMIA     OUTPATIENT/OBSERVATION GOALS TO BE MET BEFORE DISCHARGE  1. BG greater than 100 and less than 250 on two consecutive readings: No  Recent Labs   Lab Test  08/23/17   1608  08/23/17   1144  08/23/17   0821   BGM  464*  250*  340*          2. Ketones absent from urine  (hyperglycemia): Yes      3. Tolerating oral intake to maintain hydration: Yes      4. Return to near baseline physical activity: Yes     Discharge Planner Nurse   Safe discharge environment identified: Yes  Barriers to discharge: No       Entered by: Venice Osborne 08/23/2017 7:29 PM     Please review provider order for any additional goals.   Nurse to notify provider when observation goals have been met and patient is ready for discharge.     Pt A&Ox4. VSS on room air. Up independently in room and halls throughout shift. Complains of mid/lower abdominal pain this morning, but denies need for intervention. No complaints of SOB. Lung sounds clear. Tolerating diabetic diet. , 250, and 464 with appropriate coverage. Started on meal time coverage along with SSI coverage. Adequate output with small bowel movement this shift. Abdomen distended and slightly firm. US abdomen done and negative. Started diabetes education. Plan to discharge home tomorrow. Nursing will continue to monitor.

## 2017-08-24 NOTE — PLAN OF CARE
Problem: Discharge Planning  Goal: Discharge Planning (Adult, OB, Behavioral, Peds)  Outcome: No Change   PRIOR TO DISCHARGE     Comments: Insulin regimen in place working on goal: partially met    Blood sugars controlled and improved after 20 units Lantus at bedtime last night. At Lunch >300  Follow up in place: partially met       Follow up care needed to be scheduled, Follow up with DM education as outpatient. Care coordinator consult for follow up care as outpatient.

## 2017-08-24 NOTE — PLAN OF CARE
Problem: Discharge Planning  Goal: Discharge Planning (Adult, OB, Behavioral, Peds)  Outcome: No Change   PRIOR TO DISCHARGE     Comments: Insulin regimen in place working on goal  Blood sugars controlled not met  Follow up in place: Goal met

## 2017-08-24 NOTE — PLAN OF CARE
Problem: Goal Outcome Summary  Goal: Goal Outcome Summary  Outcome: Adequate for Discharge Date Met:  08/24/17  Patient demonstrated use of glucose meter and using lancet for blood drop. DC instructions reviewed with patient including follow up appointments and carbohydrate education. DC supplies/meds given to patient and reviewed including side effects/administration. All questions answered. Patient verbalizes understanding of DC care. Patient aware of endocrinologist appointment for tomorrow.

## 2017-08-24 NOTE — PLAN OF CARE
Problem: Discharge Planning  Goal: Discharge Planning (Adult, OB, Behavioral, Peds)  Outcome: No Change   PRIOR TO DISCHARGE     Comments: Insulin regimen in place working on goal  Blood sugars controlled and improved after 20 units Lantus at bedtime last night  Follow up in place: Goal met

## 2017-08-24 NOTE — PLAN OF CARE
Problem: Goal Outcome Summary  Goal: Goal Outcome Summary  Outcome: No Change  A&O. VSS. Denies pain. Lantus increased from 10U to 20U. HS blood sugar 422, 0200 blood sugar 321. C/o bilat LE numbness. Pt. Has been administering insulin and checking blood sugars with supervision.

## 2017-08-25 ENCOUNTER — TELEPHONE (OUTPATIENT)
Dept: ENDOCRINOLOGY | Facility: CLINIC | Age: 52
End: 2017-08-25

## 2017-08-25 ENCOUNTER — CARE COORDINATION (OUTPATIENT)
Dept: CARE COORDINATION | Facility: CLINIC | Age: 52
End: 2017-08-25

## 2017-08-25 NOTE — TELEPHONE ENCOUNTER
----- Message from Denise Post MD sent at 8/25/2017  4:51 PM CDT -----  Regarding: FW: Patient cancelled on call appt today   lst avilable    ----- Message -----     From: Jessa Robertson MA     Sent: 8/25/2017   1:18 PM       To: Denise Post MD  Subject: Patient cancelled on call appt today             Patient was schedule with Chary Britt today after hospital discharge for new diabetes. He cancelled 5 minutes before appt. And needs to be reschedule. Are we scheduling next avail or in consult week again?     Thanks  Jessa

## 2017-08-25 NOTE — PROGRESS NOTES
Clinic Care Coordination Contact  OUTREACH    Referral Information:  Referral Source: [facility]  Reason for Contact: Hospitalization 8/22-8/24/2017-Hyperglycemia Newly diagnosed diabetic   Care Conference: No     Universal Utilization:   ED Visits in last year: 0  Hospital visits in last year: 1  Last PCP appointment: 05/23/17     Concerns: No  Multiple Providers or Specialists:  (No)    Clinic Care Coordination Contact  [facility]/Voicemail    Referral Source: CTS  Clinical Data: Care Coordinator Outreach  Outreach attempted x 1.  Left message on voicemail with call back information and requested return call.  Plan:  Care Coordinator will try to reach patient again in 1-2 business days.  Isha Aviles RN / Clinical Care Coordinator     [address] [address]  [email or web address]  Office :  [phone] / Fax :  [phone]

## 2017-08-26 ENCOUNTER — HEALTH MAINTENANCE LETTER (OUTPATIENT)
Age: 52
End: 2017-08-26

## 2017-08-28 ENCOUNTER — TELEPHONE (OUTPATIENT)
Dept: INTERNAL MEDICINE | Facility: CLINIC | Age: 52
End: 2017-08-28

## 2017-08-28 ENCOUNTER — OFFICE VISIT (OUTPATIENT)
Dept: INTERNAL MEDICINE | Facility: CLINIC | Age: 52
End: 2017-08-28
Payer: MEDICAID

## 2017-08-28 ENCOUNTER — CARE COORDINATION (OUTPATIENT)
Dept: CARE COORDINATION | Facility: CLINIC | Age: 52
End: 2017-08-28

## 2017-08-28 VITALS
HEART RATE: 84 BPM | DIASTOLIC BLOOD PRESSURE: 62 MMHG | OXYGEN SATURATION: 97 % | SYSTOLIC BLOOD PRESSURE: 118 MMHG | BODY MASS INDEX: 26.22 KG/M2 | TEMPERATURE: 98.8 F | HEIGHT: 72 IN | WEIGHT: 193.6 LBS

## 2017-08-28 DIAGNOSIS — R63.4 UNINTENTIONAL WEIGHT LOSS: ICD-10-CM

## 2017-08-28 DIAGNOSIS — Z09 HOSPITAL DISCHARGE FOLLOW-UP: Primary | ICD-10-CM

## 2017-08-28 DIAGNOSIS — Z12.11 SCREENING FOR COLON CANCER: ICD-10-CM

## 2017-08-28 DIAGNOSIS — Z76.89 ENCOUNTER TO ESTABLISH CARE: ICD-10-CM

## 2017-08-28 DIAGNOSIS — Z79.4 TYPE 2 DIABETES MELLITUS WITHOUT COMPLICATION, WITH LONG-TERM CURRENT USE OF INSULIN (H): ICD-10-CM

## 2017-08-28 DIAGNOSIS — Z23 NEED FOR VACCINATION: ICD-10-CM

## 2017-08-28 DIAGNOSIS — E11.9 TYPE 2 DIABETES MELLITUS WITHOUT COMPLICATION, WITH LONG-TERM CURRENT USE OF INSULIN (H): ICD-10-CM

## 2017-08-28 LAB
CREAT UR-MCNC: 34 MG/DL
MICROALBUMIN UR-MCNC: <5 MG/L
MICROALBUMIN/CREAT UR: NORMAL MG/G CR (ref 0–17)

## 2017-08-28 PROCEDURE — 99207 C FOOT EXAM  NO CHARGE: CPT | Performed by: INTERNAL MEDICINE

## 2017-08-28 PROCEDURE — 90472 IMMUNIZATION ADMIN EACH ADD: CPT | Performed by: INTERNAL MEDICINE

## 2017-08-28 PROCEDURE — 99496 TRANSJ CARE MGMT HIGH F2F 7D: CPT | Mod: 25 | Performed by: INTERNAL MEDICINE

## 2017-08-28 PROCEDURE — 82043 UR ALBUMIN QUANTITATIVE: CPT | Performed by: INTERNAL MEDICINE

## 2017-08-28 PROCEDURE — 90715 TDAP VACCINE 7 YRS/> IM: CPT | Performed by: INTERNAL MEDICINE

## 2017-08-28 PROCEDURE — 90471 IMMUNIZATION ADMIN: CPT | Performed by: INTERNAL MEDICINE

## 2017-08-28 PROCEDURE — 90732 PPSV23 VACC 2 YRS+ SUBQ/IM: CPT | Performed by: INTERNAL MEDICINE

## 2017-08-28 RX ORDER — ASPIRIN 81 MG/1
81 TABLET ORAL DAILY
Qty: 30 TABLET | Refills: 11 | Status: SHIPPED | OUTPATIENT
Start: 2017-08-28

## 2017-08-28 RX ORDER — ATORVASTATIN CALCIUM 40 MG/1
40 TABLET, FILM COATED ORAL DAILY
Qty: 90 TABLET | Refills: 3 | Status: SHIPPED | OUTPATIENT
Start: 2017-08-28 | End: 2017-10-20

## 2017-08-28 NOTE — MR AVS SNAPSHOT
After Visit Summary   8/28/2017    David Santos    MRN: [id number]           Patient Information     Date Of Birth          1965        Visit Information        Provider Department      8/28/2017 10:30 AM Bella Isaac MD [facility]        Today's Diagnoses     Need for vaccination    -  1    New onset type 2 diabetes mellitus (H)        Screening for diabetes mellitus          Care Instructions    Pneumonia and tetanus vaccines today.    ---    Urine sample today.    ---    Increase Lantus to 30 units at night. New prescription sent to pharmacy.    Increase Novolog with meals to 15 units with meals (+ the sliding scale). New prescription sent to pharmacy.    START atorvastatin 40mg daily. New prescription sent to pharmacy.    START daily baby aspirin 81mg. New prescription sent to pharmacy.    ---    I have placed a ophthalmology referral for you.    Please schedule an appointment at your earliest convenience - phone number below.     ---    You will be contacted to schedule the colonoscopy.     ---    Follow-up with me in 1-2 weeks to review blood sugars.                Follow-ups after your visit        Additional Services     GASTROENTEROLOGY ADULT REF PROCEDURE ONLY       Last Lab Result: Creatinine (mg/dL)       Date                     Value                 08/23/2017               0.68             ----------  Body mass index is 26.26 kg/(m^2).      Patient will be contacted to schedule procedure.     Please be aware that coverage of these services is subject to the terms and limitations of your health insurance plan.  Call member services at your health plan with any benefit or coverage questions.  Any procedures must be performed at a [place] facility OR coordinated by your clinic's referral office.    Please bring the following with you to your appointment:    (1) Any X-Rays, CTs or MRIs which have been performed.  Contact the facility where they were done  to arrange for  prior to your scheduled appointment.    (2) List of current medications   (3) This referral request   (4) Any documents/labs given to you for this referral            OPHTHALMOLOGY ADULT REFERRAL       Your provider has referred you to: [facility] [phone] [email or web address]    Please be aware that coverage of these services is subject to the terms and limitations of your health insurance plan.  Call member services at your health plan with any benefit or coverage questions.      Please bring the following with you to your appointment:    (1) Any X-Rays, CTs or MRIs which have been performed.  Contact the facility where they were done to arrange for  prior to your scheduled appointment.    (2) List of current medications  (3) This referral request   (4) Any documents/labs given to you for this referral                  Your next 10 appointments already scheduled     Sep 05, 2017  9:00 AM CDT   Diabetic Education with  DIABETIC ED RESOURCE   [facility]    [address] [place] [id number] [phone]              Who to contact     If you have questions or need follow up information about today's clinic visit or your schedule please contact [facility] directly at [phone].  Normal or non-critical lab and imaging results will be communicated to you by MyChart, letter or phone within 4 business days after the clinic has received the results. If you do not hear from us within 7 days, please contact the clinic through MyChart or phone. If you have a critical or abnormal lab result, we will notify you by phone as soon as possible.  Submit refill requests through [employer] or call your pharmacy and they will forward the refill request to us. Please allow 3 business days for your refill to be completed.          Additional Information About Your  "Visit        Flashpointhart Information     exsulin lets you send messages to your doctor, view your test results, renew your prescriptions, schedule appointments and more. To sign up, go to [email or web address] . Click on \"Log in\" on the left side of the screen, which will take you to the Welcome page. Then click on \"Sign up Now\" on the right side of the page.     You will be asked to enter the access code listed below, as well as some personal information. Please follow the directions to create your username and password.     Your access code is: [email or web address]  Expires: 2017 12:46 PM     Your access code will  in 90 days. If you need help or a new code, please call your [place] clinic or [phone].        Care EveryWhere ID     This is your Care EveryWhere ID. This could be used by other organizations to access your [place] medical records  [id number]        Your Vitals Were     Pulse Temperature Height Pulse Oximetry BMI (Body Mass Index)       84 98.8  F (37.1  C) (Oral) 6' (1.829 m) 97% 26.26 kg/m2        Blood Pressure from Last 3 Encounters:   17 118/62   17 126/71   07 108/64    Weight from Last 3 Encounters:   17 193 lb 9.6 oz (87.8 kg)   17 182 lb 14.4 oz (83 kg)   07 237 lb 9.6 oz (107.8 kg)              We Performed the Following     Albumin Random Urine Quantitative with Creat Ratio     GASTROENTEROLOGY ADULT REF PROCEDURE ONLY     OPHTHALMOLOGY ADULT REFERRAL     Pneumococcal vaccine 23 valent PPSV23  (Pneumovax) [59556]     TDAP VACCINE (ADACEL) [66304.002]     TDAP VACCINE (ADACEL)     VACCINE ADMINISTRATION, INITIAL          Today's Medication Changes          These changes are accurate as of: 17 11:16 AM.  If you have any questions, ask your nurse or doctor.               Start taking these medicines.        Dose/Directions    aspirin EC 81 MG EC tablet   Used for:  New onset type 2 diabetes mellitus (H)   Started by:  Bella Isaac MD     "    Dose:  81 mg   Take 1 tablet (81 mg) by mouth daily   Quantity:  30 tablet   Refills:  11       atorvastatin 40 MG tablet   Commonly known as:  LIPITOR   Used for:  New onset type 2 diabetes mellitus (H)   Started by:  Bella Isaac MD        Dose:  40 mg   Take 1 tablet (40 mg) by mouth daily   Quantity:  90 tablet   Refills:  3         These medicines have changed or have updated prescriptions.        Dose/Directions    * insulin aspart 100 UNIT/ML injection   Commonly known as:  NovoLOG PEN   This may have changed:  Another medication with the same name was changed. Make sure you understand how and when to take each.   Used for:  New onset type 2 diabetes mellitus (H)        Dose:  1-10 Units   Inject 1-10 Units Subcutaneous 3 times daily (before meals)   Quantity:  3 mL   Refills:  0       * insulin aspart 100 UNIT/ML injection   Commonly known as:  NovoLOG PEN   This may have changed:  Another medication with the same name was changed. Make sure you understand how and when to take each.   Used for:  New onset type 2 diabetes mellitus (H)        Dose:  1-7 Units   Inject 1-7 Units Subcutaneous At Bedtime   Quantity:  3 mL   Refills:  0       * insulin aspart 100 UNIT/ML injection   Commonly known as:  NovoLOG PEN   This may have changed:  how much to take   Used for:  New onset type 2 diabetes mellitus (H)   Changed by:  Bella Isaac MD        Dose:  15 Units   Inject 15 Units Subcutaneous 3 times daily (with meals)   Quantity:  15 mL   Refills:  0       insulin glargine 100 UNIT/ML injection   Commonly known as:  LANTUS   This may have changed:  how much to take   Used for:  New onset type 2 diabetes mellitus (H)   Changed by:  Bella Isaac MD        Dose:  30 Units   Inject 30 Units Subcutaneous At Bedtime   Quantity:  9 mL   Refills:  0       * Notice:  This list has 3 medication(s) that are the same as other medications prescribed for you. Read the directions carefully, and ask your  doctor or other care provider to review them with you.         Where to get your medicines      These medications were sent to [id number] - [place], [place] - [address] [facility]  [address] [facility] [address]     Phone:  [phone]     aspirin EC 81 MG EC tablet    atorvastatin 40 MG tablet    insulin aspart 100 UNIT/ML injection    insulin glargine 100 UNIT/ML injection                Primary Care Provider Office Phone # Fax #    Bella Isaac [phone]       [address] [facility] [address]        Equal Access to Services     [facility]: Hadii aad ku hadasho Soomaali, waaxda luqadaha, qaybta kaalmada adeegyada, waxkourtney idiin hayaan adeeg amador gordon . So [facility] [phone].    ATENCIÓN: Si habla español, tiene a maldonado disposición servicios gratuitos de asistencia lingüística. [facility] [phone].    We comply with applicable federal civil rights laws and [place] laws. We do not discriminate on the basis of race, color, national origin, age, disability sex, sexual orientation or gender identity.            Thank you!     Thank you for choosing [facility]  for your care. Our goal is always to provide you with excellent care. Hearing back from our patients is one way we can continue to improve our services. Please take a few minutes to complete the written survey that you may receive in the mail after your visit with us. Thank you!             Your Updated Medication List - Protect others around you: Learn how to safely use, store and throw away your medicines at www.disposemymeds.org.          This list is accurate as of: 8/28/17 11:16 AM.  Always use your most recent med list.                   Brand Name Dispense Instructions for use Diagnosis    aspirin EC 81 MG EC tablet     30 tablet    Take 1 tablet (81 mg) by mouth daily    New onset type 2 diabetes mellitus (H)       atorvastatin 40 MG tablet    LIPITOR    90 tablet    Take 1  tablet (40 mg) by mouth daily    New onset type 2 diabetes mellitus (H)       blood glucose calibration solution    no brand specified    1 each    Use to calibrate blood glucose monitor as directed.    New onset type 2 diabetes mellitus (H)       blood glucose lancets standard    no brand specified    100 each    Use to test blood sugar 4 times daily or as directed.    New onset type 2 diabetes mellitus (H)       blood glucose monitoring meter device kit     1 kit    Use to test blood sugars 4 times daily or as directed.    New onset type 2 diabetes mellitus (H)       blood glucose monitoring test strip    no brand specified    100 strip    Use to test blood sugars 4 times daily or as directed    New onset type 2 diabetes mellitus (H)       glucose 40 % Gel gel     1 Tube    Take 15-30 g by mouth every 15 minutes as needed for low blood sugar    New onset type 2 diabetes mellitus (H)       * insulin aspart 100 UNIT/ML injection    NovoLOG PEN    3 mL    Inject 1-10 Units Subcutaneous 3 times daily (before meals)    New onset type 2 diabetes mellitus (H)       * insulin aspart 100 UNIT/ML injection    NovoLOG PEN    3 mL    Inject 1-7 Units Subcutaneous At Bedtime    New onset type 2 diabetes mellitus (H)       * insulin aspart 100 UNIT/ML injection    NovoLOG PEN    15 mL    Inject 15 Units Subcutaneous 3 times daily (with meals)    New onset type 2 diabetes mellitus (H)       insulin glargine 100 UNIT/ML injection    LANTUS    9 mL    Inject 30 Units Subcutaneous At Bedtime    New onset type 2 diabetes mellitus (H)       * Notice:  This list has 3 medication(s) that are the same as other medications prescribed for you. Read the directions carefully, and ask your doctor or other care provider to review them with you.

## 2017-08-28 NOTE — NURSING NOTE
Chief Complaint   Patient presents with     Hospital F/U       Initial /62  Pulse 84  Temp 98.8  F (37.1  C) (Oral)  Ht 6' (1.829 m)  Wt 193 lb 9.6 oz (87.8 kg)  SpO2 97%  BMI 26.26 kg/m2 Estimated body mass index is 26.26 kg/(m^2) as calculated from the following:    Height as of this encounter: 6' (1.829 m).    Weight as of this encounter: 193 lb 9.6 oz (87.8 kg).  Medication Reconciliation: complete   Zhanna Daily MA     Screening Questionnaire for Adult Immunization    Are you sick today?   No   Do you have allergies to medications, food, a vaccine component or latex?   Yes   Have you ever had a serious reaction after receiving a vaccination?   No   Do you have a long-term health problem with heart disease, lung disease, asthma, kidney disease, metabolic disease (e.g. diabetes), anemia, or other blood disorder?   Yes   Do you have cancer, leukemia, HIV/AIDS, or any other immune system problem?   No   In the past 3 months, have you taken medications that affect  your immune system, such as prednisone, other steroids, or anticancer drugs; drugs for the treatment of rheumatoid arthritis, Crohn s disease, or psoriasis; or have you had radiation treatments?   No   Have you had a seizure, or a brain or other nervous system problem?   No   During the past year, have you received a transfusion of blood or blood     products, or been given immune (gamma) globulin or antiviral drug?   No   For women: Are you pregnant or is there a chance you could become        pregnant during the next month?   No   Have you received any vaccinations in the past 4 weeks?   No     Immunization questionnaire was positive for at least one answer.  Notified Dr Isaac.        Per orders of Dr. Isaac, injection of TDAP given by Zhanna Daily. Patient instructed to remain in clinic for 15 minutes afterwards, and to report any adverse reaction to me immediately.       Screening performed by Zhanna Daily on 8/28/2017 at 10:52  AM.

## 2017-08-28 NOTE — PATIENT INSTRUCTIONS
Pneumonia and tetanus vaccines today.    ---    Urine sample today.    ---    Increase Lantus to 30 units at night. New prescription sent to pharmacy.    Increase Novolog with meals to 15 units with meals (+ the sliding scale). New prescription sent to pharmacy.    START atorvastatin 40mg daily. New prescription sent to pharmacy.    START daily baby aspirin 81mg. New prescription sent to pharmacy.    ---    I have placed a ophthalmology referral for you.    Please schedule an appointment at your earliest convenience - phone number below.     ---    You will be contacted to schedule the colonoscopy.     ---    Follow-up with me in 1-2 weeks to review blood sugars.

## 2017-08-28 NOTE — TELEPHONE ENCOUNTER
Pharmacy called and they are needing clarification on the insulin glargine (LANTUS) 100 UNIT/ML injection they stated that if it is intended for the lantus solostar then this only comes in 3ml, but if it is intended for the lantus vial then it comes in 10ml. Please contact pharmacy with clarification or send in new rx to [id number] - [place], [place] - [address] [facility]    Telephone Fax   [phone]

## 2017-08-28 NOTE — PROGRESS NOTES
SUBJECTIVE:      David aSntos is a pleasant 52 year old male who presents for hospital follow-up, diabetes follow-up, and to establish care:    Hospital: [facility]  Date of Admission: 8/22/17  Date of Discharge: 8/24/17  Reason(s) for Admission: new onset diabetes    Diagnostic tests, treatments/interventions, and discharge summary reviewed.    Summary of hospitalization:  - prior to presentation patient had not seen a doctor in over 10 years  - presented to [facility] complaining of unintentional weight loss over the last 3 months  - endorsed fatigue, polyuria, and polydipsia  - endorsed bilateral numbness and tingling in his feet  - blood sugar found to be markedly elevated at 653 on presentation; anion gap within normal limits  - started on Lantus 20 units at night, 7 units of NovoLog with meals + high intensity sliding scale    Medication changes since discharge: None  Adherent to discharge medications: Yes  Problems taking discharge medications: No    Follow-up: n/a     Update since discharge:   - overall, feeling better than he did when he went to the hospital  - energy is improving over time  - appetite is good - eating well    - no abdominal pain, nausea, or vomiting  - no diarrhea or constipation  - no fevers or chills  - no chest pain or palpitations  - no shortness of breath or cough    DIABETES MANAGEMENT                                                      Current DM regimen: Lantus 20 units q.h.s., 7 units NovoLog with meals + sliding scale  Adherent to regimen: Yes  Adherent to ADA diet: No - has not been educated regarding ADA diet  Fasting BGs: 400s to >500  Pre-meal BGs: 400s to >500  Episodes of hypoglycemia: denies    Ophthalmology: DUE  Retinopathy: unknown  Neuropathy: yes - both feet   Nephropathy: Unknown  Checking feet/seeing podiatry: No  Influenza vaccine: n/a   Pneumococcal vaccine: DUE    Aspirin Rx: not on - should be   ACE-I/ARB: not on - not hypertensive and no urine microalbumin on  file  Statin: not on - should be    Last HgbA1c: >16 (8/22/17)  Last LDL: no cholesterol on file    ESTABLISH CARE                                                      PMH, PSH, FH, SH, medications, allergies, immunizations, and preventative health measures reviewed.     Past Medical History:   Diagnosis Date     Type 2 diabetes mellitus (H)      Past Surgical History:   Procedure Laterality Date     EYE SURGERY Left 1978    ? for strabismus     HAND SURGERY Bilateral 1979    unknown repairs after running into window and cutting hands     Family History   Problem Relation Age of Onset     Type 2 Diabetes Sister      x1 full sister; x 3 half-sisters     Myocardial Infarction Brother 50     CEREBROVASCULAR DISEASE No family hx of      Coronary Artery Disease Early Onset No family hx of      Prostate Cancer No family hx of      Colon Cancer No family hx of      Occupational History     Unemployed      Social History Main Topics     Smoking status: Current Some Day Smoker     Packs/day: 2.00     Years: 14.00     Smokeless tobacco: Never Used      Comment: uses vaporizer with tobacco, 6-7x/day     Alcohol use Yes      Comment: history of alcohol abuse; 1-2 beers/night     Drug use: No      Comment: marijuana use 2-3x/day; history of methamphetamine use (none since July, 2017)     Sexual activity: Not Currently     Social History Narrative    Dating.    Adult son.    Living with roommates.    Not formal exercise.      Allergies   Allergen Reactions     Vicodin [Hydrocodone-Acetaminophen] Nausea and Vomiting     Vomiting and sweats     Current Outpatient Prescriptions   Medication Sig     insulin glargine (LANTUS) 100 UNIT/ML injection Inject 20 Units Subcutaneous At Bedtime     insulin aspart (NOVOLOG PEN) 100 UNIT/ML injection Inject 7 Units Subcutaneous 3 times daily (with meals)     insulin aspart (NOVOLOG PEN) 100 UNIT/ML injection Inject 1-10 Units Subcutaneous 3 times daily (before meals)     insulin aspart (NOVOLOG  PEN) 100 UNIT/ML injection Inject 1-7 Units Subcutaneous At Bedtime     PREVENTATIVE HEALTH                                                      BMI: within normal limits   Blood pressure: within normal limits   Prostate Cancer Screening: did not discuss today  AAA screening: not medically indicated at this time   Colonoscopy: DUE  Screening HCV: not medically indicated at this time   STD testing: discussed with and declined by patient  Depression screening: PHQ-2 assessment completed and reviewed - no intervention indicated at this time  Alcohol misuse screening: alcohol use reviewed - no intervention indicated at this time  Immunizations: reviewed; tetanus booster and Pneumovax DUE    OBJECTIVE:       /62  Pulse 84  Temp 98.8  F (37.1  C) (Oral)  Ht 6' (1.829 m)  Wt 193 lb 9.6 oz (87.8 kg)  SpO2 97%  BMI 26.26 kg/m2  Constitutional: well-appearing  Respiratory: normal respiratory effort; clear to auscultation bilaterally  Cardiovascular: regular rate and rhythm; pedal pulses palpable; no edema  Gastrointestinal: soft, non-tender, non-distended, and bowel sounds present; no organomegaly or masses   Musculoskeletal: normal gait and station; feet intact - no lesions; sensation intact to light touch   Psych: normal judgment and insight; normal mood and affect; recent and remote memory intact    ASSESSMENT/PLAN:       (Z09) Hospital discharge follow-up  (primary encounter diagnosis)  (R63.4) Unintentional weight loss  (E11.9,  Z79.4) Type 2 diabetes mellitus without complication, with long-term current use of insulin (H)  Comment: diabetes continues to be poorly controlled.  Plan:   - INCREASE Lantus to 30 units q.h.s.   - INCREASE NovoLog to 15 units with meals.   - CONTINUE high intensity sliding scale.   - urine microalbumin today.   - START atorvastatin 40 mg daily.   - START aspirin 81 mg daily.   - referred to ophthalmology for routine DM exam - patient to schedule.   - follow-up in 1-2 weeks  max.    - will consider starting metformin at that time.    - will refer to diabetes educator at that time.    (Z76.89) Encounter to establish care  Comment: PMH, PSH, FH, SH, medications, allergies, immunizations, and preventative health measures reviewed.   Plan:   - can discuss prostate cancer screening at next visit.   - see below for remainder plans.    (Z23) Need for vaccination  Plan: TDAP and Pneumovax given today.    (Z12.11) Screening for colon cancer  Plan: colonoscopy ordered - patient will be contacted to schedule.  The instructions on the AVS were discussed and explained to the patient. Patient expressed understanding of instructions.    (Chart documentation was completed, in part, with [employer] voice-recognition software. Even though reviewed, some grammatical, spelling, and word errors may remain.)    Bella Isaac MD   [address] [address]  T: [phone], F: [phone]

## 2017-08-28 NOTE — LETTER
08/28/17      David Santos  [address] [facility] [address]        Dear ,    It was a pleasure meeting you the other day! I hope this finds you well.    I wanted to let you know that your urine lab came back as normal (no kidney damage from diabetes).     See you soon to review blood sugars at the higher insulin doses.    Please feel free to contact me with any questions or concerns.      Take care,    Bella Isaac MD   [place] [address] [address] [place] [address]  T: [phone], F: [phone]

## 2017-08-28 NOTE — LETTER
[facility]  Complex Care Plan  About Me  Patient Name:  David Santos    YOB: 1965  Age:   52 year old   [place] MRN: [id number] Telephone Information:     Home Phone [phone]   Mobile [phone]       Address:    [address]  [facility] [address] Email address:  No e-mail address on record      Emergency Contact(s)  Name Relationship Lgl Grd Work Phone Home Phone Mobile Phone   1. TACHO MARY Friend   [phone]    2. YONIS SCRUGGS Friend   [phone]           Primary language:  English     needed? No   [place] [facility]:  [phone] op. 1  Other communication barriers: No  Preferred Method of Communication:  Letter, Phone  Current living arrangement:    Mobility Status/ Medical Equipment: Independent  Other information to know about me:    Health Maintenance  Health Maintenance Reviewed: Due/Overdue not assessed     My Access Plan  Medical Emergency 911   Primary Clinic Line [facility]- [phone]   24 Hour Appointment Line [phone] or  [phone]) (toll-free)   24 Hour Nurse Line [phone] (toll-free)   Preferred Urgent Care [facility], [phone]   Preferred Hospital [facility]  [phone]   Preferred Pharmacy [id number] - [place], [place] - [address]     Behavioral Health Crisis Line The National Suicide Prevention Lifeline at 1-936.989.4915 or 911     My Care Team Members  Patient Care Team       Relationship Specialty Notifications Start End    Bella Isaac MD PCP - General Internal Medicine  8/24/17     Phone: [phone] Fax: [phone]         [address] [facility] [address]         My Care Plans  Self Management and Treatment Plan  Goals and (Comments)  Goal #1: I will keep my blood sugars in the normal range  I will test my blood sugar 4 times a day     50% of goal reached      Action  Plans on File: Diabetes  Advance Care Plans/Directives Type:   Type Advanced Care Plans/Directives:  (NA)    My Medical and Care Information  Problem List   Patient Active Problem List   Diagnosis     Hemorrhage of rectum and anus     Tobacco use disorder     New onset type 2 diabetes mellitus (H)      Current Medications and Allergies:  See printed Medication Report.    Care Coordination Start Date:  8/28/2017   Frequency of Care Coordination:  (Every 2-4 weeks)   Form Last Updated: 08/28/2017

## 2017-08-28 NOTE — LETTER
[facility]  [address]  [address]  Phone  [phone]  Fax   [phone]            Dear David,    It was a pleasure to speak with you after your visit with Dr Isaac .  I would like to provide you with the enclosed information for your records.  As part of care coordination, we are developing care plans to assist in accomplishing your health care goals.  When we speak next, please feel free to let me know if you want to add or change any information on your care plans.    As always, feel free to contact me if you have any questions or concerns.  I look forward to working with you in the effort to achieve your health care and wellness goals .        Sincerely,        Isha Aviles RN, Care Coordinator  [facility]   [email or web address]   [phone]

## 2017-08-29 NOTE — PROGRESS NOTES
Clinic Care Coordination Contact  OUTREACH     Referral Information:  Referral Source: [facility]  Reason for Contact: Hospitalization 8/22-8/24/2017-Hyperglycemia Newly diagnosed diabetic   Care Conference: No     Universal Utilization:   ED Visits in last year: 0  Hospital visits in last year: 1  Last PCP appointment: 8/28/2017       Concerns: No  Multiple Providers or Specialists:  (No)    Clinical Concerns:  Current Medical Concerns: [facility].  Patient states he had a good visit today .  Patient state his insulin pen stuck and plans to go to the pharmacy to report this incident .  CC explained to the patient that I will support him and to call with any future questions or concerns.  Patient agrees with the plan     Current Behavioral Concerns: Not discussed today      Clinical Pathway Name: Diabetes  Clinical Pathway: Reviewed at PCP visit today     Medication Management:  Reviewed at PCP visit today     Functional Status:  Mobility Status: Independent  Resources and Interventions:  Current Resources:  (NA);  (NA)  PAS Number:  (NA)  Senior Linkage Line Referral Placed:  (NA)  Advanced Care Plans/Directives on file:: No  Referrals Placed:  (NA)     Goals:   Goal 1 Statement: I will keep my blood sugars in the normal range  Goal 1 Progression Percent: 50%  Goal 1 Progression Date: 08/28/17         Barriers: New diabetes diagnosis   Strengths: CDE visit 9/5/2017  Patient/Caregiver understanding: Expresses good understanding of discharge instructions   Frequency of Care Coordination:  (Every 2-4 weeks)  Upcoming appointment: 08/28/17     Plan: CC will follow up in 1-2 weeks     Isha Aviles RN / Clinical Care Coordinator     [address] [address]  [email or web address]  Office :  [phone] / Fax :  [phone]

## 2017-09-05 ENCOUNTER — ALLIED HEALTH/NURSE VISIT (OUTPATIENT)
Dept: EDUCATION SERVICES | Facility: CLINIC | Age: 52
End: 2017-09-05
Payer: MEDICAID

## 2017-09-05 DIAGNOSIS — Z79.4 TYPE 2 DIABETES MELLITUS WITHOUT COMPLICATION, WITH LONG-TERM CURRENT USE OF INSULIN (H): ICD-10-CM

## 2017-09-05 DIAGNOSIS — E11.9 NEW ONSET TYPE 2 DIABETES MELLITUS (H): Primary | ICD-10-CM

## 2017-09-05 DIAGNOSIS — E11.9 TYPE 2 DIABETES MELLITUS WITHOUT COMPLICATION, WITH LONG-TERM CURRENT USE OF INSULIN (H): ICD-10-CM

## 2017-09-05 PROCEDURE — G0108 DIAB MANAGE TRN  PER INDIV: HCPCS | Performed by: INTERNAL MEDICINE

## 2017-09-05 NOTE — PATIENT INSTRUCTIONS
My Diabetes Care Goals:    Taking Medication: Increase Lantus to 36 units at bed.     Aim for 60-75 grams of carbohydrate per meal.     Continue 15 u Novolog 3 times per day with meals.     Follow up:  Call ([phone]), e-mail [email or web address]), or send MyChart message with questions, concerns or if follow-up is needed.     Bring blood glucose meter and logbook with you to all doctor and follow-up appointments.     [place] [facility] for the [facility]:  For Your Diabetes Education and Nutrition Appointments Call:  [phone]   For Diabetes Education or Nutrition Related Questions:   Phone: [phone]  E-mail: [email or web address]  Fax: [phone]   If you need a medication refill please contact your pharmacy. Please allow 3 business days for your refills to be completed.    Instructions for emailing the Diabetes Educators    If you need to communicate a non-urgent message to a Diabetes Educator via email, please send to [email or web address].    Please follow the following email guidelines:    Subject line: Secure: your clinic name (example: Secure: [facility])  In the email please include: First name, middle initial, last name and date of birth.    We will be in touch with you within one (1) business day.

## 2017-09-05 NOTE — PROGRESS NOTES
"Diabetes Self Management Training: Initial Assessment Visit for Newly Diagnosed Patients (Complete AADE Goals Flowsheet)    David Santos presents today for education related to Type 2 diabetes.    He is accompanied by self     Patient's diabetes management related comments/concerns: \"I had a low\"    Patient's emotional response to diabetes: expresses readiness to learn and concern for health and well-being    Patient would like this visit to be focused around the following diabetes-related behaviors and goals: Assistance with making lifestyle changes, Healthy Eating, Monitoring and Taking Medication    ASSESSMENT:  Patient Problem List and Family Medical History reviewed for relevant medical history, current medical status, and diabetes risk factors.    Current Diabetes Management per Patient:  Taking diabetes medications?   yes:     Diabetes Medication(s)     Diabetic Other Sig    glucose 40 % GEL gel Take 15-30 g by mouth every 15 minutes as needed for low blood sugar    Insulin Sig    insulin glargine (LANTUS) 100 UNIT/ML injection Inject 30 Units Subcutaneous At Bedtime    insulin aspart (NOVOLOG PEN) 100 UNIT/ML injection Inject 15 Units Subcutaneous 3 times daily (with meals)       Problems taking diabetes medications regularly? No     *Abbreviated insulin dose documentation key: Insulin Trade Name (tevenznyt-evhnd-qopefz-bedtime) - i.e. Humalog 5-5-5-0 (Humalog 5 units at breakfast, 5 units at lunch, and 5 units at dinner).    Past Diabetes Education: Newly diagnosed    Patient glucose self monitoring as follows: 5-6 times daily.   BG meter: Accu-chek Jaleesa meter  BG results:       BG values are: Not in goal  Patient's most recent   Lab Results   Component Value Date    A1C >16.0 08/22/2017    is not meeting goal of <8.0    Nutrition:  Patient eats 3 meals per day    Breakfast - pancakes (4 4in pancakes) w/orange marmalade, sausage, coffee (with creamer)  Lunch - cheeseburger (no bread lately), water   Dinner " - canned goods (spagettios, beeraroni), water  Snacks - rice kriskpie bars    Beverages: a lot of water, orange juice, coffee    Cultural/Hindu diet restrictions: No     Biggest Challenge to Healthy Eating: knowing what to eat    Physical Activity:    Does a lot of walking (4-5 hours per day)    Diabetes Risk Factors:  family history, age over 45 years, ethnicity and overweight/obesity. Sisters have dm    Diabetes Complications:  Acute Complications: At risk for hypoglycemia? yes  Frequency of hypoglycemia: 0. Pt's reported low was 98.   Patient carries a carbohydrate source with them regularly: No     Vitals:  There were no vitals taken for this visit.  Estimated body mass index is 26.26 kg/(m^2) as calculated from the following:    Height as of 8/28/17: 1.829 m (6').    Weight as of 8/28/17: 87.8 kg (193 lb 9.6 oz).   Last 3 BP:   BP Readings from Last 3 Encounters:   08/28/17 118/62   08/24/17 126/71   05/23/07 108/64       History   Smoking Status     Current Some Day Smoker     Packs/day: 2.00     Years: 14.00   Smokeless Tobacco     Never Used     Comment: uses vaporizer with tobacco, 6-7x/day       Labs:  Lab Results   Component Value Date    A1C >16.0 08/22/2017     Lab Results   Component Value Date     08/23/2017     No results found for: LDL  No results found for: HDL]  GFR Estimate   Date Value Ref Range Status   08/23/2017 >90 >60 mL/min/1.7m2 Final     Comment:     Non  GFR Calc     GFR Estimate If Black   Date Value Ref Range Status   08/23/2017 >90 >60 mL/min/1.7m2 Final     Comment:      GFR Calc     Lab Results   Component Value Date    CR 0.68 08/23/2017     No results found for: MICROALBUMIN    Socio/Economic Considerations:    Support system: family    Health Beliefs and Attitudes:   Patient Activation Measure Survey Score:  No flowsheet data found.    Stage of Change: ACTION (Actively working towards change)      Diabetes knowledge and skills  assessment:     Patient is knowledgeable in diabetes management concepts related to: Monitoring and Taking Medication    Patient needs further education on the following diabetes management concepts: Healthy Eating, Being Active, Monitoring, Problem Solving, Reducing Risks and Healthy Coping    Barriers to Learning Assessment: No Barriers identified    Based on learning assessment above, most appropriate setting for further diabetes education would be: Group class or Individual setting.    INTERVENTION:   Pt with majority of his numbers >200 and all of his fasting blood sugars elevated.  Increased Lantus 6 units today to 36 u at bed (~10% increase).  He currently is not having consistent intake of carbs but has set dosing for his meal time insulin so educated on consistency with his carbs at meals.  Discussed that as he feels comfortable we could always transition to an I:C ratio if desired for a bit more flexibility with meals.     Education provided today on:  AADE Self-Care Behaviors:  Healthy Eating: carbohydrate counting, consistency in amount, composition, and timing of food intake and label reading. Currently his breakfast has >60 grams of carb and lunch has very minimal carb.  Focused on consistency and having about 60-75 grams of carb at each meal given his set insulin dosing (ideally 60).   Being Active: relationship to blood glucose  Monitoring: individual blood glucose targets and frequency of monitoring  Taking Medication: action of prescribed medication, drawing up, administering and storing injectable diabetes medications, proper site selection and rotation for injections and side effects of prescribed medications  Problem Solving: low blood glucose - causes, signs/symptoms, treatment and prevention and carrying a carbohydrate source at all times. Educated that his 98 was not a low blood sugar but likely felt low because of how his numbers have been running for so long.     Opportunities for ongoing  education and support in diabetes-self management were discussed.    Pt verbalized understanding of concepts discussed and recommendations provided today.       Education Materials Provided:  [facility] Diabetes Booklet    PLAN:  See Patient Instructions for co-developed, patient-stated behavior change goals.  Recommend increase to insulin - Increase Lantus to 36 u at bedtime.  Continue to test before and 2 hours after meals.   Continue 15 u novolog with meals tid.   If having a snack, try to choose snacks with minimal amounts of carbs (nuts, cheese, non-starchy vegetables).  AVS printed and provided to patient today.    FOLLOW-UP:  Follow-up appointment scheduled on 9/13/17.  Education topics to cover at the next diabetes education visit(s): review blood sugars, complication prevention, sick days  Chart routed to referring provider.    Ongoing plan for education and support: Written resources (magazines, books, etc.), Follow-up visit with diabetes educator in 1 week and Follow-up with primary care provider    MANDIE Feliz CDE    Time Spent: 60 minutes  Encounter Type: Individual    Any diabetes medication dose changes were made via the CDE Protocol and Collaborative Practice Agreement with the patient's referring provider. A copy of this encounter was shared with the provider.

## 2017-09-05 NOTE — MR AVS SNAPSHOT
After Visit Summary   9/5/2017    David Santos    MRN: [id number]           Patient Information     Date Of Birth          1965        Visit Information        Provider Department      9/5/2017 9:00 AM  DIABETIC ED RESOURCE [facility]        Today's Diagnoses     New onset type 2 diabetes mellitus (H)    -  1      Care Instructions    My Diabetes Care Goals:    Taking Medication: Increase Lantus to 36 units at bed.     Aim for 60-75 grams of carbohydrate per meal.     Continue 15 u Novolog 3 times per day with meals.     Follow up:  Call [phone]), e-mail ([email or web address]), or send [place] [employer] message with questions, concerns or if follow-up is needed.     Bring blood glucose meter and logbook with you to all doctor and follow-up appointments.     [place] [facility] for the [facility]:  For Your Diabetes Education and Nutrition Appointments Call:  [phone]   For Diabetes Education or Nutrition Related Questions:   Phone: [phone]  E-mail: [email or web address]  Fax: [phone]   If you need a medication refill please contact your pharmacy. Please allow 3 business days for your refills to be completed.    Instructions for emailing the Diabetes Educators    If you need to communicate a non-urgent message to a Diabetes Educator via email, please send to [email or web address].    Please follow the following email guidelines:    Subject line: Secure: your clinic name (example: Secure: [facility])  In the email please include: First name, middle initial, last name and date of birth.    We will be in touch with you within one (1) business day.             Follow-ups after your visit        Your next 10 appointments already scheduled     Oct 04, 2017  4:00 PM CDT   (Arrive by 3:45 PM)   NEW DIABETES with Rukhsana Wild MD   [facility] ([facility] [place])    [address] [place] [address]  "[facility] [address] [phone]              Who to contact     If you have questions or need follow up information about today's clinic visit or your schedule please contact [facility] directly at [phone].  Normal or non-critical lab and imaging results will be communicated to you by MyChart, letter or phone within 4 business days after the clinic has received the results. If you do not hear from us within 7 days, please contact the clinic through MyChart or phone. If you have a critical or abnormal lab result, we will notify you by phone as soon as possible.  Submit refill requests through [employer] or call your pharmacy and they will forward the refill request to us. Please allow 3 business days for your refill to be completed.          Additional Information About Your Visit        [employer] Information     [employer] lets you send messages to your doctor, view your test results, renew your prescriptions, schedule appointments and more. To sign up, go to [email or web address] . Click on \"Log in\" on the left side of the screen, which will take you to the Welcome page. Then click on \"Sign up Now\" on the right side of the page.     You will be asked to enter the access code listed below, as well as some personal information. Please follow the directions to create your username and password.     Your access code is: [email or web address]  Expires: 2017 12:46 PM     Your access code will  in 90 days. If you need help or a new code, please call your [place] clinic or [phone].        Care EveryWhere ID     This is your Care EveryWhere ID. This could be used by other organizations to access your [place] medical records  [id number]         Blood Pressure from Last 3 Encounters:   17 118/62   17 126/71   07 108/64    Weight from Last 3 Encounters:   17 87.8 kg (193 lb 9.6 oz)   17 83 kg (182 lb 14.4 oz)   07 107.8 kg (237 lb 9.6 oz)            "   Today, you had the following     No orders found for display         Today's Medication Changes          These changes are accurate as of: 9/5/17 10:06 AM.  If you have any questions, ask your nurse or doctor.               These medicines have changed or have updated prescriptions.        Dose/Directions    blood glucose monitoring test strip   Commonly known as:  ACCU-CHEK SMARTVIEW   This may have changed:  additional instructions   Used for:  New onset type 2 diabetes mellitus (H)        Use to test blood sugar 6 times daily or as directed.  Ok to substitute alternative if insurance prefers.   Quantity:  100 each   Refills:  6            Where to get your medicines      These medications were sent to [id number] - [place], [place] - [address]  [address] [address]     Phone:  [phone]     blood glucose monitoring test strip                Primary Care Provider Office Phone # Fax #    Bella Isaac [phone]       [address] [facility] [address]        Equal Access to Services     DELIA CELAYA : Hadii aad ku hadasho Soomaali, waaxda luqadaha, qaybta kaalmada adeegyada, waxay idiin hayaan valerio thrasheraramila gordon . So [facility] [phone].    ATENCIÓN: Si habla español, tiene a maldonado disposición servicios gratuitos de asistencia lingüística. Llame al [phone].    We comply with applicable federal civil rights laws and [place] laws. We do not discriminate on the basis of race, color, national origin, age, disability sex, sexual orientation or gender identity.            Thank you!     Thank you for choosing [facility]  for your care. Our goal is always to provide you with excellent care. Hearing back from our patients is one way we can continue to improve our services. Please take a few minutes to complete the written survey that you may receive in the mail after your visit with us. Thank you!             Your Updated Medication  List - Protect others around you: Learn how to safely use, store and throw away your medicines at www.disposemymeds.org.          This list is accurate as of: 9/5/17 10:06 AM.  Always use your most recent med list.                   Brand Name Dispense Instructions for use Diagnosis    aspirin EC 81 MG EC tablet     30 tablet    Take 1 tablet (81 mg) by mouth daily    Type 2 diabetes mellitus without complication, with long-term current use of insulin (H)       atorvastatin 40 MG tablet    LIPITOR    90 tablet    Take 1 tablet (40 mg) by mouth daily    Type 2 diabetes mellitus without complication, with long-term current use of insulin (H)       blood glucose calibration solution    no brand specified    1 each    Use to calibrate blood glucose monitor as directed.    New onset type 2 diabetes mellitus (H)       blood glucose lancets standard    no brand specified    100 each    Use to test blood sugar 4 times daily or as directed.    New onset type 2 diabetes mellitus (H)       blood glucose monitoring meter device kit     1 kit    Use to test blood sugars 4 times daily or as directed.    New onset type 2 diabetes mellitus (H)       blood glucose monitoring test strip    ACCU-CHEK SMARTVIEW    100 each    Use to test blood sugar 6 times daily or as directed.  Ok to substitute alternative if insurance prefers.    New onset type 2 diabetes mellitus (H)       glucose 40 % Gel gel     1 Tube    Take 15-30 g by mouth every 15 minutes as needed for low blood sugar    New onset type 2 diabetes mellitus (H)       * insulin aspart 100 UNIT/ML injection    NovoLOG PEN    3 mL    Inject 1-10 Units Subcutaneous 3 times daily (before meals)    New onset type 2 diabetes mellitus (H)       * insulin aspart 100 UNIT/ML injection    NovoLOG PEN    3 mL    Inject 1-7 Units Subcutaneous At Bedtime    New onset type 2 diabetes mellitus (H)       * insulin aspart 100 UNIT/ML injection    NovoLOG PEN    15 mL    Inject 15 Units  Subcutaneous 3 times daily (with meals)    Type 2 diabetes mellitus without complication, with long-term current use of insulin (H)       insulin glargine 100 UNIT/ML injection    LANTUS    9 mL    Inject 30 Units Subcutaneous At Bedtime    Type 2 diabetes mellitus without complication, with long-term current use of insulin (H)       * Notice:  This list has 3 medication(s) that are the same as other medications prescribed for you. Read the directions carefully, and ask your doctor or other care provider to review them with you.

## 2017-09-13 ENCOUNTER — ALLIED HEALTH/NURSE VISIT (OUTPATIENT)
Dept: EDUCATION SERVICES | Facility: CLINIC | Age: 52
End: 2017-09-13
Payer: MEDICAID

## 2017-09-13 DIAGNOSIS — E11.9 TYPE 2 DIABETES MELLITUS WITHOUT COMPLICATION, WITH LONG-TERM CURRENT USE OF INSULIN (H): ICD-10-CM

## 2017-09-13 DIAGNOSIS — E11.9 NEW ONSET TYPE 2 DIABETES MELLITUS (H): Primary | ICD-10-CM

## 2017-09-13 DIAGNOSIS — Z79.4 TYPE 2 DIABETES MELLITUS WITHOUT COMPLICATION, WITH LONG-TERM CURRENT USE OF INSULIN (H): ICD-10-CM

## 2017-09-13 PROCEDURE — G0108 DIAB MANAGE TRN  PER INDIV: HCPCS

## 2017-09-13 NOTE — PROGRESS NOTES
Diabetes Self Management Training: Follow-up Visit    David Santos presents today for education and evaluation of glucose control related to Type 2 diabetes.    He is accompanied by self    Patient's diabetes management related comments/concerns: almost out of novolog insulin and running out of insulin pen needles.     Patient would like this visit to be focused around the following diabetes-related behaviors and goals: Healthy Eating, Monitoring and Taking Medication    ASSESSMENT:  Patient Problem List reviewed for relevant medical history and current medical status.    Current Diabetes Management per Patient:  Taking diabetes medications?   yes:     Diabetes Medication(s)     Diabetic Other Sig    glucose 40 % GEL gel Take 15-30 g by mouth every 15 minutes as needed for low blood sugar    Insulin Sig    insulin glargine (LANTUS) 100 UNIT/ML injection Inject 36 Units Subcutaneous At Bedtime    insulin aspart (NOVOLOG PEN) 100 UNIT/ML injection Inject 15 Units Subcutaneous 3 times daily (with meals)    insulin aspart (NOVOLOG PEN) 100 UNIT/ML injection Inject 1-10 Units Subcutaneous 3 times daily (before meals)    insulin aspart (NOVOLOG PEN) 100 UNIT/ML injection Inject 1-7 Units Subcutaneous At Bedtime      , Problems taking diabetes medications regularly? No     *Abbreviated insulin dose documentation key: Insulin Trade Name (vnexbrwwf-jjnzq-lxvrcp-bedtime) - i.e. Humalog 5-5-5-0 (Humalog 5 units at breakfast, 5 units at lunch, and 5 units at dinner).    Patient glucose self monitoring as follows: 5-6 times daily.   BG meter: Accu-chek Jaleesa meter  BG results:        BG values are: Not in goal but are improving    Patient's most recent   Lab Results   Component Value Date    A1C >16.0 08/22/2017    is not meeting goal of <8.0    Nutrition:  Patient eats 3 meals per day    Breakfast - pancakes (4 4in) with sausage and coffee (sugar free creamer) and   Lunch - cheeseburger (no bun), water   Dinner - canned goods  with water   Snacks - rice krispie bar    Beverages:  Crystal light, water, coffee    Cultural/Moravian diet restrictions: No     Biggest Challenge to Healthy Eating: knowing what to eat    Physical Activity:    Continues to walk a lot at work.    Diabetes Complications:  Acute Complications: At risk for hypoglycemia? yes  Symptoms of low blood sugar? sweating and shaking  Frequency of hypoglycemia: none  Patient carries a carbohydrate source with them regularly: Yes   Type of carbohydrate: glucose gel and candy  Patient wears medical identification for diabetes: No   Chronic Complication Prevention: Eyes: exam within in the last year? No  Nerve/Circulation: foot exam within the last year Yes  Heart Health: BP to goal Yes, LDL to goal unable to assess, Daily Aspirin Yes  Dental Health: brushing/flossing regularly Yes, dental exam within last year No  Immunizations (flu/pneumonia) up to date? Up to date with pneumonia but not flu yet    Vitals:  There were no vitals taken for this visit.  Estimated body mass index is 26.26 kg/(m^2) as calculated from the following:    Height as of 8/28/17: 1.829 m (6').    Weight as of 8/28/17: 87.8 kg (193 lb 9.6 oz).     Last 3 BP:   BP Readings from Last 3 Encounters:   08/28/17 118/62   08/24/17 126/71   05/23/07 108/64       History   Smoking Status     Current Some Day Smoker     Packs/day: 2.00     Years: 14.00   Smokeless Tobacco     Never Used     Comment: uses vaporizer with tobacco, 6-7x/day       Labs:  Lab Results   Component Value Date    A1C >16.0 08/22/2017     Lab Results   Component Value Date     08/23/2017     No results found for: LDL  No results found for: HDL]  GFR Estimate   Date Value Ref Range Status   08/23/2017 >90 >60 mL/min/1.7m2 Final     Comment:     Non  GFR Calc     GFR Estimate If Black   Date Value Ref Range Status   08/23/2017 >90 >60 mL/min/1.7m2 Final     Comment:      GFR Calc     Lab Results   Component  Value Date    CR 0.68 08/23/2017     No results found for: MICROALBUMIN    Health Beliefs and Attitudes:   Patient Activation Measure Survey Score:  No flowsheet data found.    Stage of Change: ACTION (Actively working towards change)    Progress toward meeting diabetes-related behavioral goals:    GOALS % Met Goal   Healthy Eating 50   Physical Activity 50   Monitoring 75   Medication Taking 100   Problem Solving 75   Healthy Coping 50   Risk Reduction 0       Diabetes knowledge and skills assessment:     Patient is knowledgeable in diabetes management concepts related to: Monitoring, Taking Medication and Problem Solving    Patient needs further education on the following diabetes management concepts: Healthy Eating, Being Active, Monitoring, Reducing Risks and Healthy Coping    Barriers to Learning Assessment: No Barriers identified    Based on learning assessment above, most appropriate setting for further diabetes education would be: Group class or Individual setting.    INTERVENTION:  Overall, pt's fasting BG's remain elevated so will increase Lantus by 8 units. The rest of his day is more variable.  His BG is elevated usually going into his lunch meal and then is dropping after the meal (355 to 110 and 189 to 98 for example).  He has not started eating carbs yet at this meal which is why his BG is dropping as he is giving his 15 u novolog at all meals. Explained that since he is giving insulin at this meal he needs to have some carbohydrate.  For snacks he has sweets between meals but then is not taking insulin so explained that this is causing his BG to spike.  To avoid lows following lunch, recommended he move his sweets to his midday meal or have some other form of carbohydrate at this meal (encouraged healthier options as well).  As he does not take insulin with snacks, educated him on having low or no carb food items for snacks (cheese, nuts, non-starchy vegetables, etc). Emphasized importance of testing  his BG if he feels low and proper treatment for hypoglycemia.     Education provided today on:  AADE Self-Care Behaviors:  Healthy Eating: consistency in amount, composition, and timing of food intake. Encouraged healthier options like fruit instead of sweets.   Monitoring: log and interpret results, individual blood glucose targets and frequency of monitoring.   Reducing Risks: major complications of diabetes, prevention, early diagnostic measures and treatment of complications, foot care, appropriate dental care, annual eye exam, smoking cessation, aspirin therapy, A1C - goals, relating to blood glucose levels, how often to check, lipids levels and goals and blood pressure and goals    Opportunities for ongoing education and support in diabetes-self management were discussed.    Pt verbalized understanding of concepts discussed and recommendations provided today.       Education Materials Provided:  Goals for Your Diabetes Care    PLAN:  See Patient Instructions for co-developed, patient-stated behavior change goals.  Recommend increase to insulin - Increase Lantus to 44 u (increase of ~10%).  Pt will check before and 2 hours after each meal so we can better assess Novolog dose with meals.   Pt to call if having any low blood sugars.   Ordered insulin pen needles for David as he only has 8 left and no current order for pen needles.   Sent message to PCP re: pt running low on Novolog and only has <100 u left. Needs refill ordered.  AVS printed and provided to patient today.    FOLLOW-UP:  Follow-up appointment scheduled on 9/27/17 in clinic but will call pt Monday 9/18 to review blood sugars over the phone.  Education topics to cover at the next diabetes education visit(s): sick days, heart health, review of blood sugars  Chart routed to referring provider.    Ongoing plan for education and support: Written resources (magazines, books, etc.), Follow-up visit with diabetes educator in 2 weeks in clinic and  Follow-up with primary care provider    MANDIE Feliz CDE    Time Spent: 60 minutes  Encounter Type: Individual    Any diabetes medication dose changes were made via the CDE Protocol and Collaborative Practice Agreement with the patient's referring provider. A copy of this encounter was shared with the provider.

## 2017-09-13 NOTE — MR AVS SNAPSHOT
After Visit Summary   9/13/2017    David Santos    MRN: [id number]           Patient Information     Date Of Birth          1965        Visit Information        Provider Department      9/13/2017 9:00 AM  DIABETIC ED RESOURCE [facility]        Today's Diagnoses     New onset type 2 diabetes mellitus (H)    -  1      Care Instructions    My Diabetes Care Goals:    Monitoring: Check and record blood sugars before and 2 hours after each meal.     Have bun with burger for lunch.     Have Rice Krispie bar or honey bun with a meal instead of between meals (if having honey bun then don't have the bread with lunch).     Have nuts or cheese or non-starchy vegetables between meals if hungry.    Increase Lantus to 44 units.    Please call if any blood sugar readings <70.    Follow up:  Follow-up diabetes education appointment scheduled on Monday 9/18 via phone at 8 am.   Call ([phone]), e-mail [email or web address]), or send [employer] message with questions, concerns or if follow-up is needed.     Bring blood glucose meter and logbook with you to all doctor and follow-up appointments.     [facility] for the [facility]:  For Your Diabetes Education and Nutrition Appointments Call:  [phone]   For Diabetes Education or Nutrition Related Questions:   Phone: [phone]  E-mail: [email or web address]  Fax: [phone]   If you need a medication refill please contact your pharmacy. Please allow 3 business days for your refills to be completed.    Instructions for emailing the Diabetes Educators    If you need to communicate a non-urgent message to a Diabetes Educator via email, please send to [email or web address].    Please follow the following email guidelines:    Subject line: Secure: your clinic name (example: Secure: [facility])  In the email please include: First name, middle initial, last name and date of birth.    We will be in  "touch with you within one (1) business day.             Follow-ups after your visit        Your next 10 appointments already scheduled     Sep 27, 2017  9:00 AM CDT   Diabetic Education with CS DIABETIC ED RESOURCE   [facility]    [address] [place] [id number] [phone]            Oct 04, 2017  3:00 PM CDT   (Arrive by 2:45 PM)   NEW DIABETES with Geraldine Dillon MD   [facility] ([facility])    [address] [place] [address]  3rd Floor  [employer] [address] [phone]              Who to contact     If you have questions or need follow up information about today's clinic visit or your schedule please contact [facility] directly at [phone].  Normal or non-critical lab and imaging results will be communicated to you by [employer], letter or phone within 4 business days after the clinic has received the results. If you do not hear from us within 7 days, please contact the clinic through [employer] or phone. If you have a critical or abnormal lab result, we will notify you by phone as soon as possible.  Submit refill requests through [employer] or call your pharmacy and they will forward the refill request to us. Please allow 3 business days for your refill to be completed.          Additional Information About Your Visit        [employer] Information     [employer]use lets you send messages to your doctor, view your test results, renew your prescriptions, schedule appointments and more. To sign up, go to [email or web address] . Click on \"Log in\" on the left side of the screen, which will take you to the Welcome page. Then click on \"Sign up Now\" on the right side of the page.     You will be asked to enter the access code listed below, as well as some personal information. Please follow the directions to create your username and password.     Your access code is: [email or web address]  Expires: 11/20/2017 12:46 PM     Your " access code will  in 90 days. If you need help or a new code, please call your [facility] clinic or [phone].        Care EveryWhere ID     This is your Care EveryWhere ID. This could be used by other organizations to access your [facility] medical records  [id number]         Blood Pressure from Last 3 Encounters:   17 118/62   17 126/71   07 108/64    Weight from Last 3 Encounters:   17 87.8 kg (193 lb 9.6 oz)   17 83 kg (182 lb 14.4 oz)   07 107.8 kg (237 lb 9.6 oz)              Today, you had the following     No orders found for display       Primary Care Provider Office Phone # Fax #    Bella Isaac [phone]       [address] [facility] [address]        Equal Access to Services     [facility] : Hadii aad ku hadasho Soomaali, waaxda luqadaha, qaybta kaalmada adeegyada, waxay cristyin haygerbern valerio gordon . So [facility] [phone].    ATENCIÓN: Si habla español, tiene a maldonado disposición servicios gratuitos de asistencia lingüística. Roxanne al [phone].    We comply with applicable federal civil rights laws and [place] laws. We do not discriminate on the basis of race, color, national origin, age, disability sex, sexual orientation or gender identity.            Thank you!     Thank you for choosing [facility]  for your care. Our goal is always to provide you with excellent care. Hearing back from our patients is one way we can continue to improve our services. Please take a few minutes to complete the written survey that you may receive in the mail after your visit with us. Thank you!             Your Updated Medication List - Protect others around you: Learn how to safely use, store and throw away your medicines at www.disposemymeds.org.          This list is accurate as of: 17  9:51 AM.  Always use your most recent med list.                   Brand Name Dispense Instructions for use Diagnosis    aspirin EC 81 MG EC  tablet     30 tablet    Take 1 tablet (81 mg) by mouth daily    Type 2 diabetes mellitus without complication, with long-term current use of insulin (H)       atorvastatin 40 MG tablet    LIPITOR    90 tablet    Take 1 tablet (40 mg) by mouth daily    Type 2 diabetes mellitus without complication, with long-term current use of insulin (H)       blood glucose calibration solution    no brand specified    1 each    Use to calibrate blood glucose monitor as directed.    New onset type 2 diabetes mellitus (H)       blood glucose lancets standard    no brand specified    100 each    Use to test blood sugar 4 times daily or as directed.    New onset type 2 diabetes mellitus (H)       blood glucose monitoring meter device kit     1 kit    Use to test blood sugars 4 times daily or as directed.    New onset type 2 diabetes mellitus (H)       blood glucose monitoring test strip    ACCU-CHEK SMARTVIEW    100 each    Use to test blood sugar 6 times daily or as directed.  Ok to substitute alternative if insurance prefers.    New onset type 2 diabetes mellitus (H)       glucose 40 % Gel gel     1 Tube    Take 15-30 g by mouth every 15 minutes as needed for low blood sugar    New onset type 2 diabetes mellitus (H)       * insulin aspart 100 UNIT/ML injection    NovoLOG PEN    3 mL    Inject 1-10 Units Subcutaneous 3 times daily (before meals)    New onset type 2 diabetes mellitus (H)       * insulin aspart 100 UNIT/ML injection    NovoLOG PEN    3 mL    Inject 1-7 Units Subcutaneous At Bedtime    New onset type 2 diabetes mellitus (H)       * insulin aspart 100 UNIT/ML injection    NovoLOG PEN    15 mL    Inject 15 Units Subcutaneous 3 times daily (with meals)    Type 2 diabetes mellitus without complication, with long-term current use of insulin (H)       insulin glargine 100 UNIT/ML injection    LANTUS    9 mL    Inject 36 Units Subcutaneous At Bedtime    Type 2 diabetes mellitus without complication, with long-term current use  of insulin (H)       * Notice:  This list has 3 medication(s) that are the same as other medications prescribed for you. Read the directions carefully, and ask your doctor or other care provider to review them with you.

## 2017-09-13 NOTE — PATIENT INSTRUCTIONS
My Diabetes Care Goals:    Monitoring: Check and record blood sugars before and 2 hours after each meal.     Have bun with burger for lunch.     Have Rice Krispie bar or honey bun with a meal instead of between meals (if having honey bun then don't have the bread with lunch).     Have nuts or cheese or non-starchy vegetables between meals if hungry.    Increase Lantus to 44 units.    Please call if any blood sugar readings <70.    Follow up:  Follow-up diabetes education appointment scheduled on Monday 9/18 via phone at 8 am.   Call ([phone]), e-mail ([email or web address]), or send MobiApps message with questions, concerns or if follow-up is needed.     Bring blood glucose meter and logbook with you to all doctor and follow-up appointments.     [place] for the [facility]:  For Your Diabetes Education and Nutrition Appointments Call:  [phone]   For Diabetes Education or Nutrition Related Questions:   Phone: [phone]  E-mail: [email or web address]  Fax: [phone]   If you need a medication refill please contact your pharmacy. Please allow 3 business days for your refills to be completed.    Instructions for emailing the Diabetes Educators    If you need to communicate a non-urgent message to a Diabetes Educator via email, please send to [email or web address].    Please follow the following email guidelines:    Subject line: Secure: your clinic name (example: Secure: [facility])  In the email please include: First name, middle initial, last name and date of birth.    We will be in touch with you within one (1) business day.

## 2017-09-14 ENCOUNTER — TELEPHONE (OUTPATIENT)
Dept: INTERNAL MEDICINE | Facility: CLINIC | Age: 52
End: 2017-09-14

## 2017-09-14 ENCOUNTER — TELEPHONE (OUTPATIENT)
Dept: EDUCATION SERVICES | Facility: CLINIC | Age: 52
End: 2017-09-14

## 2017-09-14 DIAGNOSIS — Z79.4 TYPE 2 DIABETES MELLITUS WITHOUT COMPLICATION, WITH LONG-TERM CURRENT USE OF INSULIN (H): ICD-10-CM

## 2017-09-14 DIAGNOSIS — E11.9 NEW ONSET TYPE 2 DIABETES MELLITUS (H): ICD-10-CM

## 2017-09-14 DIAGNOSIS — E11.9 TYPE 2 DIABETES MELLITUS WITHOUT COMPLICATION, WITH LONG-TERM CURRENT USE OF INSULIN (H): ICD-10-CM

## 2017-09-14 NOTE — TELEPHONE ENCOUNTER
Spoke to patient who reports he is nearly out of insulin due to the recently increased dose. Does not have enough for dinner.   Also wants prescription for pen needles and BG strips.    Per chart, patient was seen by CDE yesterday and physician signed prescriptions for insulins.   Spoke to MD who has resent prescriptions for Lantus and Novolog to [facility] pharmacy in [place] [employer].    Informed patient that there are scripts for strips and needles entered this month, each with 6 refills ordered.     He will wait 10-15 minutes and call pharmacy to see if they have received prescriptions.   Will call me back if there is any confusion.      No call back by 4:05.   Per MD, patient with very high A1C needs close follow up and frequent insulin adjustment so she did not order refills.   He is scheduled with CDE on 9/27, endo on 10/4.   CDE voices plan to speak on patient Monday on phone for BG update.     Radha Orosco RD, LD, CDE

## 2017-09-14 NOTE — TELEPHONE ENCOUNTER
Pt requesting new pharmacy, also patient needs pen needles but insurance does not cover    blood glucose (NO BRAND SPECIFIED) lancets standard      Last Written Prescription Date:  08/23/2017  Last Fill Quantity: 100,   # refills: 11  Last Office Visit with [facility], [facility] or [facility] prescribing provider: 08/28/2017  Future Office visit:

## 2017-09-15 ENCOUNTER — TELEPHONE (OUTPATIENT)
Dept: EDUCATION SERVICES | Facility: CLINIC | Age: 52
End: 2017-09-15

## 2017-09-15 DIAGNOSIS — E11.9 NEW ONSET TYPE 2 DIABETES MELLITUS (H): ICD-10-CM

## 2017-09-15 NOTE — TELEPHONE ENCOUNTER
David calls to say that he was able to  insulins last night at the pharmacy, but they would not provide pen needles or strips  There is a prescription entered for both in the chart.     Called [facility] pharmacy on [address] in [place]:   1) Prescription for strips was written for 100, this is a 17 day supply. He picked up on the 5th and cannot now  until the 17th of September. Re-wrote the order for 200 strips, a 1 month supply (at 6x/day).  2) Needles were not filled due to brand ordered not on formulary. Pharmacist voiced he will find out what is covered best and provide that. Denies that I need to change the order to generic.    Called patient to alert to update.   He voices that he has been instructed to check 7 times per day, before and after each meal and HS. Also if he feels low.   He describes some low sx with BG's of 110, 74.   Reviewed safe/normal range and reminded with history of recent high numbers (A1C >16%) his body will need to get used to the normal range.   He voices understanding.     He has only 2 pen needles left. I have left 15 for him and the  clinic  in case any problems at pharmacy.  He has <50 strips, but not sure how many. Reviewed will need 18 to get through until 9/19 at 6/day. If running out, check less frequently after meals for a day to conserve strips.     Will talk to CDE again Monday.

## 2017-09-15 NOTE — TELEPHONE ENCOUNTER
Can you please reach out to patient to see how he's doing on refills of insulin and diabetes supplies?    We have been having some difficulty getting him refills (timing, quantity, pharmacy, etc.).    Also, I think he should continue to follow closely with Bibi for the time being - she is doing an excellent job adjusting his insulin    We should plan on diabetes follow-up in early December (earlier as needed).    Thank you.

## 2017-09-18 ENCOUNTER — TELEPHONE (OUTPATIENT)
Dept: EDUCATION SERVICES | Facility: CLINIC | Age: 52
End: 2017-09-18

## 2017-09-18 DIAGNOSIS — Z79.4 TYPE 2 DIABETES MELLITUS WITHOUT COMPLICATION, WITH LONG-TERM CURRENT USE OF INSULIN (H): ICD-10-CM

## 2017-09-18 DIAGNOSIS — E11.9 TYPE 2 DIABETES MELLITUS WITHOUT COMPLICATION, WITH LONG-TERM CURRENT USE OF INSULIN (H): ICD-10-CM

## 2017-09-18 NOTE — TELEPHONE ENCOUNTER
Pt currently on Lantus 44 u/d and Novolog 15-15-15-0    Reviewed his blood sugars that he called in.     As his blood sugars are falling after meals, will reduce Novolog to 14-14-14-0.  On 9/16 he did NOT have a sweet after dinner and his BG was 136 before bed and 81 in the morning.  Discussed having something that is not sweet (Currently having a donut or apple fritter before bed) and having some protein or cheese stick or nut butter instead before bed.  Will leave Lantus the same for now and see how adjusting his snack changes his numbers.  If he wishes to have a sweet before bed, then will need to have him start taking some novolog with his snack in the evening. Will follow up at clinic visit this week to review his numbers again.     Will route to PCP.     Bibi Maxwell RD LD CDE

## 2017-09-18 NOTE — TELEPHONE ENCOUNTER
Called pt this morning to review blood sugars. No answer. Left vm with c/b number. Have appointment scheduled next week in clinic to follow up as well.     Bibi Maxwell, MANDIE LD CDE

## 2017-09-18 NOTE — TELEPHONE ENCOUNTER
BG report; 8 means 2 hour    Date BB AB BL AL BD AD Bed  9-13 278  293  287  229  14 262  275 192* 359  225  15 190 120* 167 74* 121  234  16 218 110* 140  248 181* 136  17 81  158  119  162  18 153  141

## 2017-09-19 NOTE — TELEPHONE ENCOUNTER
Spoke with patient and he has insulin and diabetic supplies.  He has been talking with Bibi weekly and will follow up with her.  Will schedule a follow up appt with PCP in December.

## 2017-09-21 ENCOUNTER — CARE COORDINATION (OUTPATIENT)
Dept: CARE COORDINATION | Facility: CLINIC | Age: 52
End: 2017-09-21

## 2017-09-21 NOTE — PROGRESS NOTES
Clinic Care Coordination Contact  [facility]/Voicemail      Referral Information:  Referral Source: CTS  Reason for Contact: Hospitalization 8/22-8/24/2017-Hyperglycemia Newly diagnosed diabetic   Clinical Data: Care Coordinator Outreach  Outreach attempted x 1.  Left message on voicemail with call back information and requested return call.  Plan: Care Coordinator will try to reach patient again in 3-5 business days.  Isha Aviles RN / Clinical Care Coordinator     [address] [address]  [email or web address]  Office :  [phone] / Fax :  [phone]

## 2017-09-22 ENCOUNTER — TRANSFERRED RECORDS (OUTPATIENT)
Dept: HEALTH INFORMATION MANAGEMENT | Facility: CLINIC | Age: 52
End: 2017-09-22

## 2017-10-04 ENCOUNTER — OFFICE VISIT (OUTPATIENT)
Dept: ENDOCRINOLOGY | Facility: CLINIC | Age: 52
End: 2017-10-04

## 2017-10-04 VITALS — BODY MASS INDEX: 29.12 KG/M2 | HEIGHT: 72 IN | WEIGHT: 215 LBS

## 2017-10-04 DIAGNOSIS — E11.9 TYPE 2 DIABETES MELLITUS WITHOUT COMPLICATION, WITH LONG-TERM CURRENT USE OF INSULIN (H): ICD-10-CM

## 2017-10-04 DIAGNOSIS — Z79.4 TYPE 2 DIABETES MELLITUS WITHOUT COMPLICATION, WITH LONG-TERM CURRENT USE OF INSULIN (H): ICD-10-CM

## 2017-10-04 DIAGNOSIS — E11.9 NEW ONSET TYPE 2 DIABETES MELLITUS (H): Primary | ICD-10-CM

## 2017-10-04 RX ORDER — LANCETS
EACH MISCELLANEOUS
Qty: 102 EACH | Refills: 3 | Status: SHIPPED | OUTPATIENT
Start: 2017-10-04 | End: 2017-10-05 | Stop reason: ALTCHOICE

## 2017-10-04 ASSESSMENT — PAIN SCALES - GENERAL: PAINLEVEL: NO PAIN (0)

## 2017-10-04 NOTE — PROGRESS NOTES
Endocrinology Clinic Visit 10/4/2017    NAME:  David Santos  PCP:  Bella Isaac  MRN:  [id number]  Reason for Consult:  Type 2 Diabetes  Requesting Provider:  Established Patient    Chief Complaint     Chief Complaint   Patient presents with     Consult     NEW PATIENT- DIABETES       History of Present Illness     David Santos is a 52 year old male who is seen in clinic for diabetes management.   The patient presented to [facility] on 8/22/2017 with unintentional weight loss of 60 pounds (from 240 pounds to 184 pounds), in addition to polyuria polydipsia and numbness in his feet. Blood sugar on presentation was markedly elevated at 653 mg/dL. He was not in ketoacidosis. Hemoglobin A1c was greater than 16, and dereje 65 antibody was negative. The patient had not seen a physician for over 10 years prior to admission. In the hospital he was started on insulin both basal and bolus. He was discharged on Lantus 20 units at bedtime and NovoLog 7 units before meals plus a sliding scale.  After discharge he established with a primary care provider Dr. Isaac, internal medicine. And he followed up with diabetes education, and his insulin was titrated based on his blood sugars. He has also been started on a statin and aspirin. Urine microalbumin was negative. His kidney function is normal. TSH on admission was normal at 0.95.  He reports improvement in hi BG since being on insulin but he is still trying to figure out what he can and can't eat.   He finds that when he has a bedtime snack, his AM BG is high. If he skips the bedtime snack, his AM BG is good. They have had to reduce his lantus recently.     Associated Signs/Symptoms  Hypoglycemia: no. Hyperglycemia: none.Neuropathy: paraesthesia. Vascular Symtpoms: none. Angina/CHF: none. Ulcers: No. Amputations: No    Current treatment strategy: Lantus 30 units at bedtime, Novolog 14 units before meals. + 1/25 >140.     Blood Glucose Monitoring: Meter  downloaded today in clinic and data reviewed as such:  Average B  with range    AM fasting BG average: 211   Pre-lunch average: 155   Pre-dinner average: 209   Pre-bed average: 213  Hypoglycemia: none    Diet:   Breakfast: pancake, sausage and coffe  Lunch: burger  Dinner: meat  Evening snack: tends to have an evening snack    Exercise: None    Weight:   Wt Readings from Last 4 Encounters:   17 87.8 kg (193 lb 9.6 oz)   17 83 kg (182 lb 14.4 oz)   07 107.8 kg (237 lb 9.6 oz)   07 103.4 kg (228 lb)         Diabetes History   Diagnosis, onset and course prior to visit with me: as per HPI above  Diabetes education: Yes: ongoing  Diabetes Complications:  none    Eyes: none    Nephropathy: none    Neurologic: Yes: numbness in both feet      Amputations: No      Regular visits with Podiatrist: No  Hypoglycemia: No  Diabetes-related comorbidities: none  Smoking:  Yes: working on cutting down    Problem List     Patient Active Problem List   Diagnosis     Hemorrhage of rectum and anus     Tobacco use disorder     New onset type 2 diabetes mellitus (H)        Medications     Current Outpatient Prescriptions   Medication     blood glucose monitoring (ACCU-CHEK SMARTVIEW) test strip     insulin glargine (LANTUS) 100 UNIT/ML injection     insulin aspart (NOVOLOG PEN) 100 UNIT/ML injection     insulin pen needle (BD ASHWINI U/F) 32G X 4 MM     atorvastatin (LIPITOR) 40 MG tablet     aspirin EC 81 MG EC tablet     glucose 40 % GEL gel     blood glucose monitoring (ACCU-CHEK ARTEMIO PLUS) meter device kit     [DISCONTINUED] insulin aspart (NOVOLOG PEN) 100 UNIT/ML injection     [DISCONTINUED] insulin aspart (NOVOLOG PEN) 100 UNIT/ML injection     No current facility-administered medications for this visit.         Allergies     Allergies   Allergen Reactions     Vicodin [Hydrocodone-Acetaminophen] Nausea and Vomiting     Vomiting and sweats       Medical / Surgical History     Past Medical History:    Diagnosis Date     Type 2 diabetes mellitus (H)      Past Surgical History:   Procedure Laterality Date     EYE SURGERY Left 1978    ? for strabismus     HAND SURGERY Bilateral 1979    unknown repairs after running into window and cutting hands       Social History     Social History     Social History     Marital status: Single     Spouse name: N/A     Number of children: N/A     Years of education: N/A     Occupational History     Unemployed      Social History Main Topics     Smoking status: Current Some Day Smoker     Packs/day: 2.00     Years: 14.00     Smokeless tobacco: Never Used      Comment: uses vaporizer with tobacco, 6-7x/day     Alcohol use Yes      Comment: history of alcohol abuse; 1-2 beers/night     Drug use: No      Comment: marijuana use 2-3x/day; history of methamphetamine use (none since July, 2017)     Sexual activity: Not Currently     Other Topics Concern     Not on file     Social History Narrative    Currently living with roommates.    Single. Dating.     One adult son.    No formal exercise.        Family History     Family History   Problem Relation Age of Onset     Type 2 Diabetes Sister      x1 full sister; x 3 half-sisters     Myocardial Infarction Brother 50     CEREBROVASCULAR DISEASE No family hx of      Coronary Artery Disease Early Onset No family hx of      Prostate Cancer No family hx of      Colon Cancer No family hx of        ROS     Constitutional: no fevers, chills, night sweats. Good appetite  Eyes: no vision changes, no eye redness, no diplopia  Ears, Nose, mouth, throat: no hearing changes, no tinnitus, no rhinorrhea, no nasal congestion  Cardiovascular: no chest pain, no orthopnea or PND, no edema, no palpitations  Respiratory: no dyspnea, no cough, no sputum, no wheezing  Gastrointestinal: no nausea, no vomiting, no abdominal pain, no diarrhea, no constipation  Genitourinary: no dysuria, no frequency, no urgency, no nocturia  Musculoskeletal: no joint pains, no back  pain, no cramps, no fractures  Skin: no rash, no itching, no dryness, no ulcers, no hair loss  Neurological: no headache, no weakness, + numbness in feet, no dizziness, no tremors  Psychiatric: no anxiety, no sadness  Hematologic/lymphatic: no easy bruising, no bleeding, no palor    Physical Exam   BP (P) 137/81  Pulse (P) 79  Ht 1.829 m (6')  Wt 97.5 kg (215 lb)  BMI 29.16 kg/m2  General: Comfortable, no obvious distress, normal body habitus  Eyes: Sclera anicteric, moist conjunctiva  HENT: Atraumatic, oropharynx clear, moist mucous membranes with no mucosal ulcerations  Neck: Trachea midline, supple. Thyroid: Thyroid is normal in size and texture  CV: Regular rhythm, normal rate. No murmurs auscultated  Resp: Clear to auscultation bilaterally, good effort  Abdomen:  Soft, non tender, non distended. Bowel sounds heard. No organomegaly.  Skin: No rashes, lesions, or subcutaneous nodules.   Psych: Alert and oriented x 3. Appropriate affect, good insight  Extremities: No peripheral edema  Musculoskeletal: Appropriate muscle bulk and strength  Lymphatic: No cervical lymphadenopathy  Neuro: Moves all four extremities. No focal deficits on limited exam. Gait normal.     Labs/Imaging and Outside Records     Pertinent Labs were reviewed and updated in [facility].  Radiology Results were  reviewed and updated in EPIC.    Summary of recent findings:   Lab Results   Component Value Date    A1C >16.0 08/22/2017       TSH   Date Value Ref Range Status   08/22/2017 0.95 0.40 - 4.00 mU/L Final       Creatinine   Date Value Ref Range Status   08/23/2017 0.68 0.66 - 1.25 mg/dL Final     Impression / Plan     1. Diabetes Mellitus: Type 2  New onset.   Current glycemic control can be considered sub-optimal. It has improved since admission.   He is new to insulin. He is adjusting to the diagnosis and management plan.   I am inclined to hold off on metformin start until his liver test abnormalities are further investigated.   In the  meantime, we will focus on insulin.     Pattern on his BG is for high in the AM, improvement by noon and afternoon, and high again at bedtime.     His Lantus has been reduced recently due to overnight drops. Yet, when he has a bedtime snack he is hyperglycemic the next morning. AM BG are perfect (130s) if he does not have a bedtime snack.     Plan:  - Take 4 units of Novolog with a bedtime snack. This is a starting point. He might need a bit more, but his best  will be what happens to his AM BG.   - Keep Meal novolog at 14 units for main meals  - Keep SS the same  - Keep Lantus the same.     I discussed dietary concepts today with the patient, including: general nutrition guidelines, consistent meals and and voiding uncovered snacks.     I also discussed exercise recommendations with the patient, advising for a goal of moderate physical activity 30 minutes 5 days a week. Specific examples were discussed such as brisk walking.    2. Diabetes Complications: With peripheral neuropathy. This may improve with improvement of the BG.     3. Blood Pressure Management: Blood pressure is controlled. Currently is not on pharmacotherapy for this.     4.Lipid Management: Per the new ACC/KATERINA/NHLBI guidelines, statins are recommended for individuals with diabetes aged 40-75 with LDL  without ASCVD, and for any individual with ASCVD. Currently the patient is on a statin.     5. Smoking Status: Patient Pt continues to use tobacco. He is working on eventually quitting.     Follow up: 3 months      Geraldine Dillon MD  Endocrinology, Diabetes and Metabolism  [facility]

## 2017-10-04 NOTE — LETTER
10/4/2017       RE: David Santos  [address]  [place] [address]     Dear Colleague,    Thank you for referring your patient, David Santos, to the [facility] ENDOCRINOLOGY at [facility]. Please see a copy of my visit note below.    Endocrinology Clinic Visit 10/4/2017    NAME:  David Santos  PCP:  Bella Isaac  MRN:  [id number]  Reason for Consult:  Type 2 Diabetes  Requesting Provider:  Established Patient    Chief Complaint     Chief Complaint   Patient presents with     Consult     NEW PATIENT- DIABETES       History of Present Illness     David Santos is a 52 year old male who is seen in clinic for diabetes management.   The patient presented to [facility] on 8/22/2017 with unintentional weight loss of 60 pounds (from 240 pounds to 184 pounds), in addition to polyuria polydipsia and numbness in his feet. Blood sugar on presentation was markedly elevated at 653 mg/dL. He was not in ketoacidosis. Hemoglobin A1c was greater than 16, and dereje 65 antibody was negative. The patient had not seen a physician for over 10 years prior to admission. In the hospital he was started on insulin both basal and bolus. He was discharged on Lantus 20 units at bedtime and NovoLog 7 units before meals plus a sliding scale.  After discharge he established with a primary care provider Dr. Isaac, internal medicine. And he followed up with diabetes education, and his insulin was titrated based on his blood sugars. He has also been started on a statin and aspirin. Urine microalbumin was negative. His kidney function is normal. TSH on admission was normal at 0.95.  He reports improvement in hi BG since being on insulin but he is still trying to figure out what he can and can't eat.   He finds that when he has a bedtime snack, his AM BG is high. If he skips the bedtime snack, his AM BG is good. They have had to reduce his lantus recently.     Associated  Signs/Symptoms  Hypoglycemia: no. Hyperglycemia: none.Neuropathy: paraesthesia. Vascular Symtpoms: none. Angina/CHF: none. Ulcers: No. Amputations: No    Current treatment strategy: Lantus 30 units at bedtime, Novolog 14 units before meals. +  >140.     Blood Glucose Monitoring: Meter downloaded today in clinic and data reviewed as such:  Average B  with range    AM fasting BG average: 211   Pre-lunch average: 155   Pre-dinner average: 209   Pre-bed average: 213  Hypoglycemia: none    Diet:   Breakfast: pancake, sausage and coffe  Lunch: burger  Dinner: meat  Evening snack: tends to have an evening snack    Exercise: None    Weight:   Wt Readings from Last 4 Encounters:   17 87.8 kg (193 lb 9.6 oz)   17 83 kg (182 lb 14.4 oz)   07 107.8 kg (237 lb 9.6 oz)   07 103.4 kg (228 lb)         Diabetes History   Diagnosis, onset and course prior to visit with me: as per HPI above  Diabetes education: Yes: ongoing  Diabetes Complications:  none    Eyes: none    Nephropathy: none    Neurologic: Yes: numbness in both feet      Amputations: No      Regular visits with Podiatrist: No  Hypoglycemia: No  Diabetes-related comorbidities: none  Smoking:  Yes: working on cutting down    Problem List     Patient Active Problem List   Diagnosis     Hemorrhage of rectum and anus     Tobacco use disorder     New onset type 2 diabetes mellitus (H)        Medications     Current Outpatient Prescriptions   Medication     blood glucose monitoring (ACCU-CHEK SMARTVIEW) test strip     insulin glargine (LANTUS) 100 UNIT/ML injection     insulin aspart (NOVOLOG PEN) 100 UNIT/ML injection     insulin pen needle (BD ASHWINI U/F) 32G X 4 MM     atorvastatin (LIPITOR) 40 MG tablet     aspirin EC 81 MG EC tablet     glucose 40 % GEL gel     blood glucose monitoring (ACCU-CHEK ARTEMIO PLUS) meter device kit     [DISCONTINUED] insulin aspart (NOVOLOG PEN) 100 UNIT/ML injection     [DISCONTINUED] insulin aspart (NOVOLOG  PEN) 100 UNIT/ML injection     No current facility-administered medications for this visit.         Allergies     Allergies   Allergen Reactions     Vicodin [Hydrocodone-Acetaminophen] Nausea and Vomiting     Vomiting and sweats       Medical / Surgical History     Past Medical History:   Diagnosis Date     Type 2 diabetes mellitus (H)      Past Surgical History:   Procedure Laterality Date     EYE SURGERY Left 1978    ? for strabismus     HAND SURGERY Bilateral 1979    unknown repairs after running into window and cutting hands       Social History     Social History     Social History     Marital status: Single     Spouse name: N/A     Number of children: N/A     Years of education: N/A     Occupational History     Unemployed      Social History Main Topics     Smoking status: Current Some Day Smoker     Packs/day: 2.00     Years: 14.00     Smokeless tobacco: Never Used      Comment: uses vaporizer with tobacco, 6-7x/day     Alcohol use Yes      Comment: history of alcohol abuse; 1-2 beers/night     Drug use: No      Comment: marijuana use 2-3x/day; history of methamphetamine use (none since July, 2017)     Sexual activity: Not Currently     Other Topics Concern     Not on file     Social History Narrative    Currently living with roommates.    Single. Dating.     One adult son.    No formal exercise.        Family History     Family History   Problem Relation Age of Onset     Type 2 Diabetes Sister      x1 full sister; x 3 half-sisters     Myocardial Infarction Brother 50     CEREBROVASCULAR DISEASE No family hx of      Coronary Artery Disease Early Onset No family hx of      Prostate Cancer No family hx of      Colon Cancer No family hx of        ROS     Constitutional: no fevers, chills, night sweats. Good appetite  Eyes: no vision changes, no eye redness, no diplopia  Ears, Nose, mouth, throat: no hearing changes, no tinnitus, no rhinorrhea, no nasal congestion  Cardiovascular: no chest pain, no orthopnea or  PND, no edema, no palpitations  Respiratory: no dyspnea, no cough, no sputum, no wheezing  Gastrointestinal: no nausea, no vomiting, no abdominal pain, no diarrhea, no constipation  Genitourinary: no dysuria, no frequency, no urgency, no nocturia  Musculoskeletal: no joint pains, no back pain, no cramps, no fractures  Skin: no rash, no itching, no dryness, no ulcers, no hair loss  Neurological: no headache, no weakness, + numbness in feet, no dizziness, no tremors  Psychiatric: no anxiety, no sadness  Hematologic/lymphatic: no easy bruising, no bleeding, no palor    Physical Exam   BP (P) 137/81  Pulse (P) 79  Ht 1.829 m (6')  Wt 97.5 kg (215 lb)  BMI 29.16 kg/m2  General: Comfortable, no obvious distress, normal body habitus  Eyes: Sclera anicteric, moist conjunctiva  HENT: Atraumatic, oropharynx clear, moist mucous membranes with no mucosal ulcerations  Neck: Trachea midline, supple. Thyroid: Thyroid is normal in size and texture  CV: Regular rhythm, normal rate. No murmurs auscultated  Resp: Clear to auscultation bilaterally, good effort  Abdomen:  Soft, non tender, non distended. Bowel sounds heard. No organomegaly.  Skin: No rashes, lesions, or subcutaneous nodules.   Psych: Alert and oriented x 3. Appropriate affect, good insight  Extremities: No peripheral edema  Musculoskeletal: Appropriate muscle bulk and strength  Lymphatic: No cervical lymphadenopathy  Neuro: Moves all four extremities. No focal deficits on limited exam. Gait normal.     Labs/Imaging and Outside Records     Pertinent [facility].  Radiology Results were  reviewed and updated in EPIC.    Summary of recent findings:   Lab Results   Component Value Date    A1C >16.0 08/22/2017       TSH   Date Value Ref Range Status   08/22/2017 0.95 0.40 - 4.00 mU/L Final       Creatinine   Date Value Ref Range Status   08/23/2017 0.68 0.66 - 1.25 mg/dL Final     Impression / Plan     1. Diabetes Mellitus: Type 2  New onset.    Current glycemic control can be considered sub-optimal. It has improved since admission.   He is new to insulin. He is adjusting to the diagnosis and management plan.   I am inclined to hold off on metformin start until his liver test abnormalities are further investigated.   In the meantime, we will focus on insulin.     Pattern on his BG is for high in the AM, improvement by noon and afternoon, and high again at bedtime.     His Lantus has been reduced recently due to overnight drops. Yet, when he has a bedtime snack he is hyperglycemic the next morning. AM BG are perfect (130s) if he does not have a bedtime snack.     Plan:  - Take 4 units of Novolog with a bedtime snack. This is a starting point. He might need a bit more, but his best  will be what happens to his AM BG.   - Keep Meal novolog at 14 units for main meals  - Keep SS the same  - Keep Lantus the same.     I discussed dietary concepts today with the patient, including: general nutrition guidelines, consistent meals and and voiding uncovered snacks.     I also discussed exercise recommendations with the patient, advising for a goal of moderate physical activity 30 minutes 5 days a week. Specific examples were discussed such as brisk walking.    2. Diabetes Complications: With peripheral neuropathy. This may improve with improvement of the BG.     3. Blood Pressure Management: Blood pressure is controlled. Currently is not on pharmacotherapy for this.     4.Lipid Management: Per the new ACC/KATERINA/NHLBI guidelines, statins are recommended for individuals with diabetes aged 40-75 with LDL  without ASCVD, and for any individual with ASCVD. Currently the patient is on a statin.     5. Smoking Status: Patient Pt continues to use tobacco. He is working on eventually quitting.     Follow up: 3 months      Geraldine Dillon MD  Endocrinology, Diabetes and Metabolism  [facility]

## 2017-10-04 NOTE — NURSING NOTE
Chief Complaint   Patient presents with     Consult     NEW PATIENT- DIABETES     Jessa Robertson, [facility]  Endocrinology & Diabetes 3G

## 2017-10-04 NOTE — MR AVS SNAPSHOT
After Visit Summary   10/4/2017    David Santos    MRN: [id number]           Patient Information     Date Of Birth          1965        Visit Information        Provider Department      10/4/2017 3:00 PM Geraldine Dillon MD [facility]        Today's Diagnoses     New onset type 2 diabetes mellitus (H)    -  1    Type 2 diabetes mellitus without complication, with long-term current use of insulin (H)           Follow-ups after your visit        Your next 10 appointments already scheduled     2018  9:00 AM CST   (Arrive by 8:45 AM)   RETURN DIABETES with MD SUSAN Ly [facility] Endocrinology ([facility] [place])    [address] [address] [phone]              Who to contact     Please call your clinic at [phone] to:    Ask questions about your health    Make or cancel appointments    Discuss your medicines    Learn about your test results    Speak to your doctor   If you have compliments or concerns about an experience at your clinic, or if you wish to file a complaint, please contact [facility] Patient Relations at [phone] or email us at [email or web address]         Additional Information About Your Visit        MyChart Information     Unsilohart is an electronic gateway that provides easy, online access to your medical records. With [facility], you can request a clinic appointment, read your test results, renew a prescription or communicate with your care team.     To sign up for Zipideet visit the website at [email or web address]   You will be asked to enter the access code listed below, as well as some personal information. Please follow the directions to create your username and password.     Your access code is: [email or web address]  Expires: 2017 12:46 PM     Your access code will  in 90 days. If you need help or a new code, please contact your  [facility] or call [phone] for assistance.        Care EveryWhere ID     This is your Care EveryWhere ID. This could be used by other organizations to access your [facility] medical records  [id number]        Your Vitals Were     Height BMI (Body Mass Index)                1.829 m (6') 29.16 kg/m2           Blood Pressure from Last 3 Encounters:   10/04/17 (P) 137/81   08/28/17 118/62   08/24/17 126/71    Weight from Last 3 Encounters:   10/04/17 97.5 kg (215 lb)   08/28/17 87.8 kg (193 lb 9.6 oz)   08/22/17 83 kg (182 lb 14.4 oz)              Today, you had the following     No orders found for display         Today's Medication Changes          These changes are accurate as of: 10/4/17  3:09 PM.  If you have any questions, ask your nurse or doctor.               Start taking these medicines.        Dose/Directions    blood glucose monitoring lancets   Used for:  New onset type 2 diabetes mellitus (H)   Started by:  Geraldine Dillon MD        Use to test blood sugar 3 times daily or as directed.   Quantity:  102 each   Refills:  3         These medicines have changed or have updated prescriptions.        Dose/Directions    * insulin aspart 100 UNIT/ML injection   Commonly known as:  NovoLOG PEN   This may have changed:    - Another medication with the same name was changed. Make sure you understand how and when to take each.  - Another medication with the same name was removed. Continue taking this medication, and follow the directions you see here.   Used for:  New onset type 2 diabetes mellitus (H)   Changed by:  Bella Isaac MD        Dose:  1-10 Units   Inject 1-10 Units Subcutaneous 3 times daily (before meals)   Quantity:  3 mL   Refills:  0       * insulin aspart 100 UNIT/ML injection   Commonly known as:  NovoLOG PEN   This may have changed:    - how much to take  - how to take this  - when to take this  - additional instructions  - Another medication with the  same name was removed. Continue taking this medication, and follow the directions you see here.   Used for:  Type 2 diabetes mellitus without complication, with long-term current use of insulin (H)   Changed by:  Geraldine Dillon MD        14 units before main meals and 4 units before snack.   Quantity:  15 mL   Refills:  3       insulin glargine 100 UNIT/ML injection   Commonly known as:  LANTUS   This may have changed:  how much to take   Used for:  Type 2 diabetes mellitus without complication, with long-term current use of insulin (H)        Dose:  44 Units   Inject 44 Units Subcutaneous At Bedtime   Quantity:  9 mL   Refills:  0       * Notice:  This list has 2 medication(s) that are the same as other medications prescribed for you. Read the directions carefully, and ask your doctor or other care provider to review them with you.      Stop taking these medicines if you haven't already. Please contact your care team if you have questions.     blood glucose calibration solution   Commonly known as:  no brand specified   Stopped by:  Geraldine Dillon MD           blood glucose lancets standard   Commonly known as:  no brand specified   Stopped by:  Geraldine Dillon MD                Where to get your medicines      These medications were sent to [id number] [address] [facility]  [address] [facility] [address]     Phone:  [phone]     blood glucose monitoring lancets    insulin aspart 100 UNIT/ML injection                Primary Care Provider Office Phone # Fax #    Bella Isaac [phone]       [address] [facility] [address]        Equal Access to Services     [facility] AH: Hadii aad ku hadasho Soomaali, waaxda luqadaha, qaybta kaalmada adeegyada, waxay cathleen robin. So [facility] [phone].    ATENCIÓN: Si habla español, tiene a maldonado disposición servicios gratuitos de asistencia lingüística. Llame al  [phone].    We comply with applicable federal civil rights laws and [place] laws. We do not discriminate on the basis of race, color, national origin, age, disability, sex, sexual orientation, or gender identity.            Thank you!     Thank you for choosing [facility] ENDOCRINOLOGY  for your care. Our goal is always to provide you with excellent care. Hearing back from our patients is one way we can continue to improve our services. Please take a few minutes to complete the written survey that you may receive in the mail after your visit with us. Thank you!             Your Updated Medication List - Protect others around you: Learn how to safely use, store and throw away your medicines at www.disposemymeds.org.          This list is accurate as of: 10/4/17  3:09 PM.  Always use your most recent med list.                   Brand Name Dispense Instructions for use Diagnosis    aspirin EC 81 MG EC tablet     30 tablet    Take 1 tablet (81 mg) by mouth daily    Type 2 diabetes mellitus without complication, with long-term current use of insulin (H)       atorvastatin 40 MG tablet    LIPITOR    90 tablet    Take 1 tablet (40 mg) by mouth daily    Type 2 diabetes mellitus without complication, with long-term current use of insulin (H)       blood glucose monitoring lancets     102 each    Use to test blood sugar 3 times daily or as directed.    New onset type 2 diabetes mellitus (H)       blood glucose monitoring meter device kit     1 kit    Use to test blood sugars 4 times daily or as directed.    New onset type 2 diabetes mellitus (H)       blood glucose monitoring test strip    ACCU-CHEK SMARTVIEW    200 each    Use to test blood sugar 6 times daily or as directed.  Ok to substitute alternative if insurance prefers.    New onset type 2 diabetes mellitus (H)       glucose 40 % Gel gel     1 Tube    Take 15-30 g by mouth every 15 minutes as needed for low blood sugar    New onset type 2 diabetes mellitus (H)        * insulin aspart 100 UNIT/ML injection    NovoLOG PEN    3 mL    Inject 1-10 Units Subcutaneous 3 times daily (before meals)    New onset type 2 diabetes mellitus (H)       * insulin aspart 100 UNIT/ML injection    NovoLOG PEN    15 mL    14 units before main meals and 4 units before snack.    Type 2 diabetes mellitus without complication, with long-term current use of insulin (H)       insulin glargine 100 UNIT/ML injection    LANTUS    9 mL    Inject 44 Units Subcutaneous At Bedtime    Type 2 diabetes mellitus without complication, with long-term current use of insulin (H)       insulin pen needle 32G X 4 MM    BD ASHWINI U/F    200 each    Use 4 daily as directed.    New onset type 2 diabetes mellitus (H)       * Notice:  This list has 2 medication(s) that are the same as other medications prescribed for you. Read the directions carefully, and ask your doctor or other care provider to review them with you.

## 2017-10-05 ENCOUNTER — TELEPHONE (OUTPATIENT)
Dept: ENDOCRINOLOGY | Facility: CLINIC | Age: 52
End: 2017-10-05

## 2017-10-05 DIAGNOSIS — E11.9 TYPE 2 DIABETES MELLITUS (H): Primary | ICD-10-CM

## 2017-10-05 RX ORDER — LANCETS
EACH MISCELLANEOUS
Qty: 270 EACH | Refills: 3 | Status: SHIPPED | OUTPATIENT
Start: 2017-10-05

## 2017-10-11 ENCOUNTER — CARE COORDINATION (OUTPATIENT)
Dept: CARE COORDINATION | Facility: CLINIC | Age: 52
End: 2017-10-11

## 2017-10-11 NOTE — PROGRESS NOTES
Clinic Care Coordination Contact  OUTREACH    Referral Information:  Referral Source: CTS  Reason for Contact: Reason for Contact: Hospitalization -2017-Hyperglycemia Newly diagnosed diabetic   Care Conference: No     Universal Utilization:   ED Visits in last year: 0  Hospital visits in last year: 1  Last PCP appointment: 17     Concerns: No  Multiple Providers or Specialists:  (No)    Clinical Concerns:    Clinical Pathway Name: Diabetes  Clinical Pathway: Clinic Care Coordination - Diabetes Pathway    Patient's diabetes management related comments/concerns: Healthy Eating, Being Active and Monitoring.    Patient's emotional response to diabetes: expresses readiness to learn    ASSESSMENT:  Patient Problem List and Family Medical History reviewed for relevant medical history, current medical status, and diabetes risk factors.    Current Diabetes Management per Patient:  Taking diabetes medications? Injectable Medications: Lantus and NovoLog Insulin     Past Diabetes Education: Yes    Patient glucose self monitoring as follows: four times daily.     BG results: patient reports the lowest was 67 and he didn't even feel it.  Patient states no coverage has been needed at bedtime. Saw the   Endocrinologist  10/4 and his follow up is in January.   Information from Endocrinology documentation 10/4/2017  Average B  with range                         AM fasting BG average: 211                        Pre-lunch average: 155                        Pre-dinner average: 209                        Pre-bed average: 213    BG values are: Not in goal  Patient's most recent   Lab Results   Component Value Date    A1C >16.0 2017    is not meeting goal of <7.0    Nutrition:  Patient eats 3 meals per day      Physical Activity:    Walking every day     Medication Management:  Discussed insulin dose     Functional Status:  Mobility Status: Independent  Equipment Currently Used at Home: none        Resources and Interventions:  Current Resources:  (NA);  (NA)  PAS Number:  (NA)  Senior Linkage Line Referral Placed:  (NA)  Advanced Care Plans/Directives on file:: No  Referrals Placed:  (NA)     Goals:   Goal 1 Statement: I will keep my blood sugars in the normal range  Goal 1 Progression Percent: 70%  Goal 1 Progression Date: 10/11/17      Barriers: vague about blood sugar numbers   Strengths: Saw the Endocrinologist 10/4/2017    Frequency of Care Coordination:  (Every 2-4 weeks)  Upcoming appointment: 10/2017     Plan:   CC made a follow up appointment with Dr Isaac 10/20  CC will meet the patient face to face at 10/20 appointment       Isha Aviels RN / Clinical Care Coordinator     [address] [address]  [email or web address]  Office :  [phone] / Fax :  [phone]

## 2017-10-17 ENCOUNTER — TELEPHONE (OUTPATIENT)
Dept: ENDOCRINOLOGY | Facility: CLINIC | Age: 52
End: 2017-10-17

## 2017-10-17 DIAGNOSIS — E11.9 DIABETES MELLITUS, TYPE 2 (H): Primary | ICD-10-CM

## 2017-10-17 NOTE — TELEPHONE ENCOUNTER
----- Message from Zeinab Sanchez sent at 10/17/2017  9:43 AM CDT -----  Regarding: Prior authorization confirming  Contact: [phone]  Pat from the pharmacy called to confirm prior authorization for patient's RX: NOVOLOG. Please confirm with Pharmacy if this has been received. Pharmacy can be reached at [phone].    Thank you,    Zeinab  [facility]

## 2017-10-17 NOTE — TELEPHONE ENCOUNTER
He is a  new Type 2 diabetic that  Has not tried Huamlog kwik pen yet. New orders routed to Dr Hsu for Humalog kwik pen  to replace the Novolog flexpen.

## 2017-10-17 NOTE — TELEPHONE ENCOUNTER
New type 2  Novolog  Not covered needs Humalog kwikpen  I don't see any reason he cannot use Humalog

## 2017-10-18 RX ORDER — INSULIN LISPRO 100 [IU]/ML
INJECTION, SOLUTION INTRAVENOUS; SUBCUTANEOUS
Qty: 45 ML | Refills: 3 | Status: SHIPPED | OUTPATIENT
Start: 2017-10-18 | End: 2017-11-08

## 2017-10-20 ENCOUNTER — TELEPHONE (OUTPATIENT)
Dept: INTERNAL MEDICINE | Facility: CLINIC | Age: 52
End: 2017-10-20

## 2017-10-20 ENCOUNTER — OFFICE VISIT (OUTPATIENT)
Dept: INTERNAL MEDICINE | Facility: CLINIC | Age: 52
End: 2017-10-20
Payer: COMMERCIAL

## 2017-10-20 ENCOUNTER — CARE COORDINATION (OUTPATIENT)
Dept: CARE COORDINATION | Facility: CLINIC | Age: 52
End: 2017-10-20

## 2017-10-20 VITALS
WEIGHT: 216.2 LBS | HEART RATE: 81 BPM | DIASTOLIC BLOOD PRESSURE: 70 MMHG | HEIGHT: 72 IN | SYSTOLIC BLOOD PRESSURE: 120 MMHG | TEMPERATURE: 98.4 F | BODY MASS INDEX: 29.28 KG/M2 | OXYGEN SATURATION: 98 %

## 2017-10-20 DIAGNOSIS — E11.9 NEW ONSET TYPE 2 DIABETES MELLITUS (H): Primary | ICD-10-CM

## 2017-10-20 DIAGNOSIS — E78.00 PURE HYPERCHOLESTEROLEMIA: Primary | ICD-10-CM

## 2017-10-20 DIAGNOSIS — Z23 NEED FOR PROPHYLACTIC VACCINATION AND INOCULATION AGAINST INFLUENZA: ICD-10-CM

## 2017-10-20 PROCEDURE — 99214 OFFICE O/P EST MOD 30 MIN: CPT | Mod: 25 | Performed by: INTERNAL MEDICINE

## 2017-10-20 PROCEDURE — 90686 IIV4 VACC NO PRSV 0.5 ML IM: CPT | Performed by: INTERNAL MEDICINE

## 2017-10-20 PROCEDURE — 90471 IMMUNIZATION ADMIN: CPT | Performed by: INTERNAL MEDICINE

## 2017-10-20 RX ORDER — ATORVASTATIN CALCIUM 40 MG/1
40 TABLET, FILM COATED ORAL DAILY
Qty: 90 TABLET | Refills: 3 | Status: SHIPPED | OUTPATIENT
Start: 2017-10-20 | End: 2017-12-18

## 2017-10-20 RX ORDER — ATORVASTATIN CALCIUM 80 MG/1
40 TABLET, FILM COATED ORAL DAILY
Qty: 45 TABLET | Refills: 3 | Status: SHIPPED | OUTPATIENT
Start: 2017-10-20

## 2017-10-20 NOTE — PROGRESS NOTES

## 2017-10-20 NOTE — TELEPHONE ENCOUNTER
Pat from [facility] [address] called clinic  Atorvastatin was sent over  today  Insurance will only cover 1/2 tablet  Please resend with 1/2 tab for the 80 mg  dosage  Call [phone] with questions

## 2017-10-20 NOTE — MR AVS SNAPSHOT
After Visit Summary   10/20/2017    David Santos    MRN: [id number]           Patient Information     Date Of Birth          1965        Visit Information        Provider Department      10/20/2017 11:00 AM Bella Isaac MD [facility]        Today's Diagnoses     Need for prophylactic vaccination and inoculation against influenza    -  1    Type 2 diabetes mellitus without complication, with long-term current use of insulin (H)          Care Instructions    TAKE Aspirin 81mg every single day.    TAKE Atorvastatin 40mg every single day. New prescription sent to pharmacy.    Once you finish Novolog, START Humulog with meals. New prescription sent to pharmacy.    Always take 14 units of Novolog (soon to be Humolog) with meals. If blood sugar is more than 140, add extra insulin.    ---    Flu shot today.     ---    We will work on getting outside records from eye doctor.     ---    Please schedule an appointment with diabetes educator - number below.    ---    Follow-up with me in 1 month.           Follow-ups after your visit        Additional Services     DIABETES EDUCATOR REFERRAL       DIABETES SELF MANAGEMENT TRAINING (DSMT)      Your provider has referred you to Diabetes Education: FMG: Diabetes Education - All [facility] [phone]   [email or web address]     If an urgent visit is needed or A1C is above 12, Care Team to call the Diabetes  Education Team at [phone] or send an In Basket message to the Diabetes Education Pool (P DIAB ED-PATIENT CARE).    A  will call you to make your appointment. If it has been more than 3 business days since your referral was placed, please call the above phone number to schedule.    Type of training and number of hours: Previous Diagnosis: Follow-up DSMT - 2 hours.    Medicare covers: 10 hours of initial DSMT in 12 month period from the time of first visit, plus  2 hours of follow-up DSMT annually, and additional hours as requested for insulin training.    Diabetes Type: Type 2 - On Insulin             Diabetes Co-Morbidities: neuropathy               A1C Goal:  <7.0       A1C is: Lab Results       Component                Value               Date                       A1C                      >16.0               08/22/2017              Diabetes Education Topics: Comprehensive Knowledge Assessment and Instruction and Other: insulin titration    Special Educational Needs Requiring Individual DSMT: None       MEDICAL NUTRITION THERAPY (MNT) for Diabetes    Medical Nutrition Therapy with a Registered Dietitian can be provided in coordination with Diabetes Self-Management Training to assist in achieving optimal diabetes management.    MNT Type and Hours: Previous diagnosis: Annual follow-up MNT - 2 hours                       Medicare will cover: 3 hours initial MNT in 12 month period after first visit, plus 2 hours of follow-up MNT annually    Please be aware that coverage of these services is subject to the terms and limitations of your health insurance plan.  Call member services at your health plan to determine Diabetes Self-Management Training (Codes  &amp; ) and Medical Nutrition Therapy (Codes 36671 & 56585) benefits and ask which blood glucose monitor brands are covered by your plan.  Please bring the following with you to your appointment:    (1)  List of current medications   (2)  List of Blood Glucose Monitor brands that are covered by your insurance plan  (3)  Blood Glucose Monitor and log book  (4)   Food records for the 3 days prior to your visit    The Certified Diabetes Educator may make diabetes medication adjustments per the CDE Protocol and Collaborative Practice Agreement.                  Your next 10 appointments already scheduled     Jan 05, 2018  9:00 AM CST   (Arrive by 8:45 AM)   RETURN DIABETES with Geraldine Dillon MD   [facility]  "Endocrinology ([facility] [place])    [address] [facility]  [address] [facility] [address] [phone]              Who to contact     If you have questions or need follow up information about today's clinic visit or your schedule please contact [facility] directly at [phone].  Normal or non-critical lab and imaging results will be communicated to you by MyChart, letter or phone within 4 business days after the clinic has received the results. If you do not hear from us within 7 days, please contact the clinic through MyChart or phone. If you have a critical or abnormal lab result, we will notify you by phone as soon as possible.  Submit refill requests through Assurz or call your pharmacy and they will forward the refill request to us. Please allow 3 business days for your refill to be completed.          Additional Information About Your Visit        FlatoraharKanmu Information     Assurz lets you send messages to your doctor, view your test results, renew your prescriptions, schedule appointments and more. To sign up, go to [email or web address] . Click on \"Log in\" on the left side of the screen, which will take you to the Welcome page. Then click on \"Sign up Now\" on the right side of the page.     You will be asked to enter the access code listed below, as well as some personal information. Please follow the directions to create your username and password.     Your access code is: [email or web address]  Expires: 2017 12:46 PM     Your access code will  in 90 days. If you need help or a new code, please call your [facility] clinic or [phone].        Care EveryWhere ID     This is your Care EveryWhere ID. This could be used by other organizations to access your [facility] medical records  [id number]        Your Vitals Were     Pulse Temperature Height Pulse Oximetry BMI (Body Mass Index)       81 98.4  F (36.9  C) (Oral) 6' (1.829 m) 98% 29.32 kg/m2        " Blood Pressure from Last 3 Encounters:   10/20/17 120/70   10/04/17 (P) 137/81   08/28/17 118/62    Weight from Last 3 Encounters:   10/20/17 216 lb 3.2 oz (98.1 kg)   10/04/17 215 lb (97.5 kg)   08/28/17 193 lb 9.6 oz (87.8 kg)              We Performed the Following     DIABETES EDUCATOR REFERRAL     FLU VAC, SPLIT VIRUS IM > 3 YO (QUADRIVALENT) [98739]     Vaccine Administration, Initial [06556]          Today's Medication Changes          These changes are accurate as of: 10/20/17 11:29 AM.  If you have any questions, ask your nurse or doctor.               These medicines have changed or have updated prescriptions.        Dose/Directions    insulin glargine 100 UNIT/ML injection   Commonly known as:  LANTUS   This may have changed:  how much to take   Used for:  Type 2 diabetes mellitus without complication, with long-term current use of insulin (H)        Dose:  44 Units   Inject 44 Units Subcutaneous At Bedtime   Quantity:  9 mL   Refills:  0            Where to get your medicines      These medications were sent to [id number] [address] [facility]  [address] [place] [address]     Phone:  [phone]     atorvastatin 40 MG tablet                Primary Care Provider Office Phone # Fax #    Bella Isaac [phone]       [address] [facility] [address]        Equal Access to Services     DEL[facility] CELAYA AH: Hadii aad ku hadasho Soomaali, waaxda luqadaha, qaybta kaalmada adeegyada, raji robin. So [facility] [phone].    ATENCIÓN: Si habla español, tiene a maldonado disposición servicios gratuitos de asistencia lingüística. Llame al [phone].    We comply with applicable federal civil rights laws and [place] laws. We do not discriminate on the basis of race, color, national origin, age, disability, sex, sexual orientation, or gender identity.            Thank you!     Thank you for choosing [facility]  for your care. Our goal is always to provide you with excellent care. Hearing back from our patients is one way we can continue to improve our services. Please take a few minutes to complete the written survey that you may receive in the mail after your visit with us. Thank you!             Your Updated Medication List - Protect others around you: Learn how to safely use, store and throw away your medicines at www.disposemymeds.org.          This list is accurate as of: 10/20/17 11:29 AM.  Always use your most recent med list.                   Brand Name Dispense Instructions for use Diagnosis    aspirin EC 81 MG EC tablet     30 tablet    Take 1 tablet (81 mg) by mouth daily    Type 2 diabetes mellitus without complication, with long-term current use of insulin (H)       atorvastatin 40 MG tablet    LIPITOR    90 tablet    Take 1 tablet (40 mg) by mouth daily    Type 2 diabetes mellitus without complication, with long-term current use of insulin (H)       blood glucose monitoring lancets     270 each    Use to test blood sugar 3 times daily or as directed.    Type 2 diabetes mellitus (H)       blood glucose monitoring meter device kit     1 kit    Use to test blood sugars 4 times daily or as directed.    New onset type 2 diabetes mellitus (H)       blood glucose monitoring test strip    ACCU-CHEK SMARTVIEW    200 each    Use to test blood sugar 6 times daily or as directed.  Ok to substitute alternative if insurance prefers.    New onset type 2 diabetes mellitus (H)       glucose 40 % Gel gel     1 Tube    Take 15-30 g by mouth every 15 minutes as needed for low blood sugar    New onset type 2 diabetes mellitus (H)       HumaLOG KWIKpen 100 UNIT/ML injection   Generic drug:  insulin lispro     45 mL    14 units before main meals and 4 units before snack.,approx 45 units daily    Diabetes mellitus, type 2 (H)       insulin aspart 100 UNIT/ML injection    NovoLOG PEN    15 mL    14 units before  main meals and 4 units before snack.    Type 2 diabetes mellitus without complication, with long-term current use of insulin (H)       insulin glargine 100 UNIT/ML injection    LANTUS    9 mL    Inject 44 Units Subcutaneous At Bedtime    Type 2 diabetes mellitus without complication, with long-term current use of insulin (H)       insulin pen needle 32G X 4 MM    BD ASHWINI U/F    200 each    Use 4 daily as directed.    New onset type 2 diabetes mellitus (H)

## 2017-10-20 NOTE — PROGRESS NOTES
SUBJECTIVE:                                                      HPI: David Santos is a pleasant 52 year old male who presents for diabetes follow-up:    Patient is also following with endocrinology and her diabetes educators.    He is running low on NovoLog - review of Epic shows that endocrinology prescribed Humalog to replace NovoLog.    DIABETES MANAGEMENT                                                      Current DM regimen: Lantus 30 units at bedtime, Novolog 14 units before meals + sliding scale with meals; BUT patient has only been taking Novolog with meals if BG>140 (didn't know he should take 14 units with all meals regardless of BG).   Adherent to regimen: see above  Adherent to ADA diet: trying to make better choices  Episodes of hypoglycemia: no     Ophthalmology: saw [facility] 9/22/17 - awaiting report to review  Retinopathy: unknown  Neuropathy: endorses  Nephropathy: no   Checking feet/seeing podiatry: yes   Influenza vaccine: DUE  Pneumococcal vaccine: up to date     Aspirin Rx: aspirin 81mg prescribed, BUT patient did not know he is supposed to take this daily  ACE-I/ARB: not on - not hypertensive and no microalbuminuria  Statin: atorvastatin 40mg prescribed, BUT patient did not know he is supposed to take this daily    Last HgbA1c: >16% (8/22/17)  Last LDL: none on file    The medication, allergy, and problem lists have been reviewed and updated as appropriate.       OBJECTIVE:                                                      /70 (BP Location: Left arm, Patient Position: Chair, Cuff Size: Adult Regular)  Pulse 81  Temp 98.4  F (36.9  C) (Oral)  Ht 6' (1.829 m)  Wt 216 lb 3.2 oz (98.1 kg)  SpO2 98%  BMI 29.32 kg/m2  Constitutional: well-appearing  Psych: normal judgment and insight; normal mood and affect; recent and remote memory intact      ASSESSMENT/PLAN:                                                      (E11.9) New onset type 2 diabetes mellitus (H)  (primary encounter  diagnosis)  (Z23) Need for prophylactic vaccination and inoculation against influenza  Plan:    - patient encouraged to use NovoLog (soon to be Humalog) with all meals, regardless of BG.   - patient encouraged to use aspirin 81 mg daily.   - patient encouraged to use atorvastatin 40 mg daily.   - patient encouraged to revisit with our diabetes educators for insulin adjustment and diet education.   - flu shot today.   - will work on obtaining notes from recent ophthalmology visit.   - follow-up with me in one month - will obtain HgbA1c and possibly lipid panel at that time.    - seeing endocrinology again in January.    The instructions on the AVS were discussed and explained to the patient. Patient expressed understanding of instructions.    (Chart documentation was completed, in part, with [employer] voice-recognition software. Even though reviewed, some grammatical, spelling, and word errors may remain.)    Bella Isaac MD   [address] [address]  T: [phone], F: [phone]

## 2017-10-20 NOTE — NURSING NOTE
Chief Complaint   Patient presents with     Diabetes     Follow up and refills.       Initial /70 (BP Location: Left arm, Patient Position: Chair, Cuff Size: Adult Regular)  Pulse 81  Temp 98.4  F (36.9  C) (Oral)  Ht 6' (1.829 m)  Wt 216 lb 3.2 oz (98.1 kg)  SpO2 98%  BMI 29.32 kg/m2 Estimated body mass index is 29.32 kg/(m^2) as calculated from the following:    Height as of this encounter: 6' (1.829 m).    Weight as of this encounter: 216 lb 3.2 oz (98.1 kg).  Medication Reconciliation: complete     Kaminibose MA

## 2017-10-20 NOTE — PATIENT INSTRUCTIONS
TAKE Aspirin 81mg every single day.    TAKE Atorvastatin 40mg every single day. New prescription sent to pharmacy.    Once you finish Novolog, START Humulog with meals. New prescription sent to pharmacy.    Always take 14 units of Novolog (soon to be Humolog) with meals. If blood sugar is more than 140, add extra insulin.    ---    Flu shot today.     ---    We will work on getting outside records from eye doctor.     ---    Please schedule an appointment with diabetes educator - number below.    ---    Follow-up with me in 1 month.

## 2017-10-20 NOTE — LETTER
[facility]  Complex Care Plan  About Me  Patient Name:  David Santos    YOB: 1965  Age:   52 year old   [place] MRN: [id number] Telephone Information:     Home Phone [phone]   Mobile [phone]       Address:    [address]  [place] [address] Email address:  No e-mail address on record      Emergency Contact(s)  Name Relationship Lgl Grd Work Phone Home Phone Mobile Phone   1. TACHO MARY Friend   [phone]    2. YONIS SCRUGGS Friend   [phone]           Primary language:  English     needed? No   [employer]:  [phone] op. 1  Other communication barriers: No  Preferred Method of Communication:  Letter, Phone  Current living arrangement:    Mobility Status/ Medical Equipment: Independent  Other information to know about me:    Health Maintenance  Health Maintenance Reviewed: Not assessed    My Access Plan  Medical Emergency 911   Primary Clinic Line [facility] [phone]   24 Hour Appointment Line [phone] or  [phone]) (toll-free)   24 Hour Nurse Line [phone] (toll-free)   Preferred Urgent Care [facility], [phone]   Preferred Hospital [facility]  [phone]   Preferred Pharmacy [id number] - [place], [place] - [address]     Behavioral [facility] Crisis Line The National Suicide Prevention Lifeline at 1-192.181.1596 or 911     My Care Team Members  Patient Care Team       Relationship Specialty Notifications Start End    Bella Isaac MD PCP - General Internal Medicine  8/24/17     Phone: [phone] Fax: [phone] [address] [facility] [address]         My Care Plans  Self Management and Treatment Plan  Goals and (Comments)  Goal #1: I will keep my blood sugars in the normal range--  I will test my blood sugar 4 times a day     70% of goal reached      Action Plans on  File: Diabetes  Advance Care Plans/Directives Type:   Type Advanced Care Plans/Directives:  (NA)    My Medical and Care Information  Problem List   Patient Active Problem List   Diagnosis     Hemorrhage of rectum and anus     Tobacco use disorder     New onset type 2 diabetes mellitus (H)      Current Medications and Allergies:  See printed Medication Report.    Care Coordination Start Date: 09/21/17   Frequency of Care Coordination:  (Every 2-4 weeks)   Form Last Updated: 10/20/2017

## 2017-11-07 ENCOUNTER — CARE COORDINATION (OUTPATIENT)
Dept: CARE COORDINATION | Facility: CLINIC | Age: 52
End: 2017-11-07

## 2017-11-07 NOTE — PROGRESS NOTES
Clinic Care Coordination Contact  OUTREACH    Referral Information:  Referral Source: CTS  Reason for Contact: Hospitalization 8/22-8/24/2017-Hyperglycemia Newly diagnosed diabetic   Care Conference: No     Universal Utilization:   ED Visits in last year: 0  Hospital visits in last year: 1  Last PCP appointment: 05/23/17     Concerns: No  Multiple Providers or Specialists:  (No)    Clinical Concerns:    Clinical Pathway Name: Diabetes  Clinical Pathway: Clinic Care Coordination - Diabetes Pathway    Patient's diabetes management related comments/concerns: Monitoring and Problem Solving.    Patient's emotional response to diabetes: expresses readiness to learn    ASSESSMENT:  Patient Problem List and Family Medical History reviewed for relevant medical history, current medical status, and diabetes risk factors.    Current Diabetes Management per Patient:  Taking diabetes medications?   yes:     Diabetes Medication(s)     Diabetic Other Sig    glucose 40 % GEL gel Take 15-30 g by mouth every 15 minutes as needed for low blood sugar    Insulin Sig    HUMALOG KWIKPEN 100 UNIT/ML soln 14 units before main meals and 4 units before snack.,approx 45 units daily    insulin aspart (NOVOLOG PEN) 100 UNIT/ML injection 14 units before main meals and 4 units before snack.    insulin glargine (LANTUS) 100 UNIT/ML injection Inject 44 Units Subcutaneous At Bedtime     Patient taking differently:  Inject 30 Units Subcutaneous At Bedtime       ,    Past Diabetes Education: Yes    Patient glucose self monitoring as follows: four times daily.     BG results: Patient reports he had one episode of a blood sugar reading of 47--Patient states he took the short acting insulin when his blood sugar was 90.  Patient agrees to clarify parameters with Bibi/CDE tomorrow at his visit .  Patient agrees not to take short acting insulin if blood sugar is 140 and below       BG values are: Not in goal  Patient's most recent   Lab Results   Component  Value Date    A1C >16.0 08/22/2017    is not meeting goal of <7.0    Nutrition:  Patient reports the batteries were dead in his glucometer and he got nervous and started to eat more sugar and his blood sugar increased to 260.  Patient bought new batteries last night and the glucometer is working now      Medication Management:  Reviewed insulin dose with the patient      Functional Status:  Mobility Status: Independent  Equipment Currently Used at Home: none     Resources and Interventions:  Current Resources:  (NA);  (NA)  PAS Number:  (NA)  Senior Linkage Line Referral Placed:  (NA)  Advanced Care Plans/Directives on file:: No  Referrals Placed:  (NA)     Goals:   Goal 1 Statement: I will keep my blood sugars in the normal range I will test my blood sugar 4 times a day   Goal 1 Progression Percent: 90%  Goal 1 Progression Date: 11/07/17        Barriers: one episode of a low blood sugar of 47   Treated and quickly went up to 117  Strengths: CDE appointment tomorrow and Endocrinologist 1/5       Plan: Patient is followed closely by Endocrinology and CDE.  Closed to care coordination at this time     Isha Aviles RN / Clinical Care Coordinator     [address] [address]  [email or web address]  Office :  [phone] / Fax :  [phone]

## 2017-11-08 ENCOUNTER — ALLIED HEALTH/NURSE VISIT (OUTPATIENT)
Dept: EDUCATION SERVICES | Facility: CLINIC | Age: 52
End: 2017-11-08
Payer: COMMERCIAL

## 2017-11-08 DIAGNOSIS — E11.9 DIABETES MELLITUS, TYPE 2 (H): ICD-10-CM

## 2017-11-08 DIAGNOSIS — E11.9 NEW ONSET TYPE 2 DIABETES MELLITUS (H): Primary | ICD-10-CM

## 2017-11-08 PROCEDURE — G0108 DIAB MANAGE TRN  PER INDIV: HCPCS

## 2017-11-08 RX ORDER — INSULIN LISPRO 100 [IU]/ML
INJECTION, SOLUTION INTRAVENOUS; SUBCUTANEOUS
Qty: 45 ML | Refills: 3 | Status: SHIPPED | OUTPATIENT
Start: 2017-11-08 | End: 2017-11-14

## 2017-11-08 NOTE — PROGRESS NOTES
"Diabetes Self Management Training: Follow-up Visit    David Santos presents today for education and evaluation of glucose control related to Type 2 diabetes.    He is accompanied by self    Patient's diabetes management related comments/concerns: \"I had an incident the other day\"    Patient would like this visit to be focused around the following diabetes-related behaviors and goals: Monitoring and Taking Medication    ASSESSMENT:  Patient Problem List reviewed for relevant medical history and current medical status.    Overall, his fasting numbers are much better when he does not have a snack before bed. Has started taking 4 units humalog with evening snack the past month (since seeing endo).  Appears his numbers are still elevated in the mornings following snacks.     Had an incident of a low BG (47) on 10/31.  Reports he was eating his 2 burgers with buns and drinking beers with a friend.  He took his humalog before the meal and then fell low.       Current Diabetes Management per Patient:  Taking diabetes medications?   yes:     Diabetes Medication(s)     Diabetic Other Sig    glucose 40 % GEL gel Take 15-30 g by mouth every 15 minutes as needed for low blood sugar    Insulin Sig    HUMALOG KWIKPEN 100 UNIT/ML soln 14 units before main meals and 4 units before snack.,approx 45 units daily    insulin aspart (NOVOLOG PEN) 100 UNIT/ML injection 14 units before main meals and 4 units before snack.    insulin glargine (LANTUS) 100 UNIT/ML injection Inject 44 Units Subcutaneous At Bedtime     Patient taking differently:  Inject 30 Units Subcutaneous At Bedtime         Is taking 30 u Lantus/day per patient.  + sliding scale 1/25 > 140 before meals    *Abbreviated insulin dose documentation key: Insulin Trade Name (najhdotgc-vshvm-yowazg-bedtime) - i.e. Humalog 5-5-5-0 (Humalog 5 units at breakfast, 5 units at lunch, and 5 units at dinner).    Patient glucose self monitoring as follows: 2-5 times daily.   BG meter: " Accu-chek Jaleesa meter  BG results:       BG values are: Improving with more in goal now.     Patient's most recent   Lab Results   Component Value Date    A1C >16.0 08/22/2017    is not meeting goal of <8.0    Nutrition:  Patient eats 3 meals per day and has been including carbs with his meals now since last visit.     Breakfast - pancakes x 4 and sausage and coffee  Lunch - 2 cheeseburgers with buns    Dinner - sausage with pancakes or waffles   Snacks - something sweet usually (rice krispie bar maybe)    Beverages: crystal light, water, coffee    Cultural/Mormon diet restrictions: No     Biggest Challenge to Healthy Eating: knowing what to eat    Physical Activity:    Not assessed    Diabetes Complications:  Acute Complications: At risk for hypoglycemia? yes  Symptoms of low blood sugar? sweating and shaking  Frequency of hypoglycemia: once in the past 2 weeks  Patient carries a carbohydrate source with them regularly: Yes   Type of carbohydrate: glucose gel and candy    Vitals:  There were no vitals taken for this visit.  Estimated body mass index is 29.32 kg/(m^2) as calculated from the following:    Height as of 10/20/17: 1.829 m (6').    Weight as of 10/20/17: 98.1 kg (216 lb 3.2 oz).     Wt Readings from Last 5 Encounters:   10/20/17 98.1 kg (216 lb 3.2 oz)   10/04/17 97.5 kg (215 lb)   08/28/17 87.8 kg (193 lb 9.6 oz)   08/22/17 83 kg (182 lb 14.4 oz)   05/23/07 107.8 kg (237 lb 9.6 oz)       Last 3 BP:   BP Readings from Last 3 Encounters:   10/20/17 120/70   10/04/17 (P) 137/81   08/28/17 118/62       History   Smoking Status     Current Some Day Smoker     Packs/day: 2.00     Years: 14.00   Smokeless Tobacco     Never Used     Comment: uses vaporizer with tobacco, 6-7x/day       Labs:  Lab Results   Component Value Date    A1C >16.0 08/22/2017     Lab Results   Component Value Date     08/23/2017     No results found for: LDL  No results found for: HDL]  GFR Estimate   Date Value Ref Range Status    08/23/2017 >90 >60 mL/min/1.7m2 Final     Comment:     Non  GFR Calc     GFR Estimate If Black   Date Value Ref Range Status   08/23/2017 >90 >60 mL/min/1.7m2 Final     Comment:      GFR Calc     Lab Results   Component Value Date    CR 0.68 08/23/2017     No results found for: MICROALBUMIN    Health Beliefs and Attitudes:   Patient Activation Measure Survey Score:  No flowsheet data found.    Stage of Change: ACTION (Actively working towards change)    Progress toward meeting diabetes-related behavioral goals:    GOALS % Met Goal   Healthy Eating 50   Physical Activity 50   Monitoring 75   Medication Taking 75   Problem Solving 50   Healthy Coping 50   Risk Reduction 50       Diabetes knowledge and skills assessment:     Patient is knowledgeable in diabetes management concepts related to: Healthy Eating, Being Active and Monitoring    Patient needs further education on the following diabetes management concepts: Healthy Eating, Being Active, Monitoring, Taking Medication, Problem Solving, Reducing Risks and Healthy Coping    Barriers to Learning Assessment: No Barriers identified    Based on learning assessment above, most appropriate setting for further diabetes education would be: Group class or Individual setting.    INTERVENTION:  As his fasting numbers tend to still be a bit high in the morning after an evening snack, increased his bedtime snack humalog to 5 units (was 4 u). Explained that alcohol increases our risk for low blood sugars and reiterated importance of eating with alcohol.  Suggested he take less humalog (could try half his usual dose humalog before the meal) next time he drinks alcohol with a meal and see how that works. Currently there are no before and after meal numbers so difficult to assess if his meal time dosing should also be reduced.  Instructed him to test before and 2 hours after his meals today and tomorrow and will call him Friday to review these  numbers to see if his meal time dosing should be adjusted. He verbalized understanding.     Education provided today on:  AADE Self-Care Behaviors:  Healthy Eating: consistency in amount, composition, and timing of food intake  Monitoring: log and interpret results  Taking Medication: clarified dosing with him of his insulin. Reiterated that he should take his 14 u of humalog with each meal PLUS his sliding scale depending on his BG.   Problem Solving: low blood glucose - causes, signs/symptoms, treatment and prevention, carrying a carbohydrate source at all times, when to call health care provider and sick day arrangements. Explained that if his BG is >600 his BG meter will read HI and he should go to the ED to be evaluated.  If his BG is >500 and he is feeling sx, also instructed him to go to the ED.     Opportunities for ongoing education and support in diabetes-self management were discussed.    Pt verbalized understanding of concepts discussed and recommendations provided today.       Education Materials Provided:  BG Log Sheet    PLAN:  See Patient Instructions for co-developed, patient-stated behavior change goals.  Meal Plan Recommendation: eat 3 meals a day and Aim for 4-5 carb servings at meals  Check blood sugars before meals and 2 hours after the start of meals (breakfast, lunch and supper)  Continue current Lantus dose.   Reduce humalog with meal IF having alcohol as well (could try reducing humalog by half and taking 7 units and see how the BG responds).   Recommend increase to insulin - increase humalog for evening snack to 5 units.   AVS printed and provided to patient today.    FOLLOW-UP:  Follow-up appointment scheduled on 12/5/17. Will call him this Friday 11/10 to review before and after meal blood sugars.   Education topics to cover at the next diabetes education visit(s): heart health, review blood sugars  Chart routed to referring provider.    Ongoing plan for education and support: Written  resources (magazines, books, etc.), Follow-up visit with diabetes educator in 2 days over the phone and in 1 month in clinic and Follow-up with primary care provider    MANDIE Feliz CDE    Time Spent: 45 minutes  Encounter Type: Individual    Any diabetes medication dose changes were made via the CDE Protocol and Collaborative Practice Agreement with the patient's referring provider. A copy of this encounter was shared with the provider.

## 2017-11-08 NOTE — Clinical Note
FYI, pt's BG's still are a bit higher in the mornings when he has an evening snack so increased his humalog from 4 u to 5 u with his evening snack. He had that low BG of 47 following a meal that had f alcohol with it (unclear how much alcohol) so instructed pt to reduce his insulin if he is going to have alcohol with his meal (suggested trying half his normal dose as a starting point and seeing how the BG responds) and reiterated importance of eating with alcohol (which he reports he did in this case).  Currently no before and after meal numbers to assess to see if the 14 u at meals is a bit strong so having him check this the next couple days and will call him Friday to review his numbers and adjust insulin as needed. Thank you for the referral! Bibi Maxwell RD LD CDE

## 2017-11-08 NOTE — MR AVS SNAPSHOT
After Visit Summary   11/8/2017    David Santos    MRN: [id number]           Patient Information     Date Of Birth          1965        Visit Information        Provider Department      11/8/2017 10:30 AM  DIABETIC ED RESOURCE [facility]        Today's Diagnoses     New onset type 2 diabetes mellitus (H)    -  1      Care Instructions    My Diabetes Care Goals:    Monitoring: check before and 2 hours after meals for the next few days.     Increase humalog dose to 5 units before bedtime snack (IF having a bedtime snack).     Take 14 units of Humalog with meals Plus sliding scale based on blood sugar reading before meals.  Will assess if this dose should be adjusted next week with some after meal numbers.     Follow up:  Will call on Friday Nov 10 to review blood sugars. If low blood sugar after meals before Nov 10, please call.     Bring blood glucose meter and logbook with you to all doctor and follow-up appointments.     [facility] for the [facility]:  For Your Diabetes Education and Nutrition Appointments Call:  [phone]   For Diabetes Education or Nutrition Related Questions:   Phone: [phone]  E-mail: [email or web address]  Fax: [phone]   If you need a medication refill please contact your pharmacy. Please allow 3 business days for your refills to be completed.    Instructions for emailing the Diabetes Educators    If you need to communicate a non-urgent message to a Diabetes Educator via email, please send to [email or web address].    Please follow the following email guidelines:    Subject line: Secure: your clinic name (example: Secure: [facility])  In the email please include: First name, middle initial, last name and date of birth.    We will be in touch with you within one (1) business day.             Follow-ups after your visit        Your next 10 appointments already scheduled     Dec 05, 2017  9:00 AM CST  "  Diabetic Education with CS DIABETIC ED RESOURCE   [facility]    [address] [id number] [phone]            2018  9:00 AM CST   (Arrive by 8:45 AM)   RETURN DIABETES with Geraldine Dillon MD   [facility] ([place] [facility])    [address]  3rd Floor  [facility] [address] [phone]              Who to contact     If you have questions or need follow up information about today's clinic visit or your schedule please contact [facility] directly at [phone].  Normal or non-critical lab and imaging results will be communicated to you by [employer], letter or phone within 4 business days after the clinic has received the results. If you do not hear from us within 7 days, please contact the clinic through [employer] or phone. If you have a critical or abnormal lab result, we will notify you by phone as soon as possible.  Submit refill requests through [employer] or call your pharmacy and they will forward the refill request to us. Please allow 3 business days for your refill to be completed.          Additional Information About Your Visit        MyChart Information     [employer] lets you send messages to your doctor, view your test results, renew your prescriptions, schedule appointments and more. To sign up, go to [email or web address] . Click on \"Log in\" on the left side of the screen, which will take you to the Welcome page. Then click on \"Sign up Now\" on the right side of the page.     You will be asked to enter the access code listed below, as well as some personal information. Please follow the directions to create your username and password.     Your access code is: [email or web address]  Expires: 2017 11:46 AM     Your access code will  in 90 days. If you need help or a new code, please call your [facility] or [phone].        Care EveryWhere ID     This is your " Care EveryWhere ID. This could be used by other organizations to access your [place] medical records  [id number]         Blood Pressure from Last 3 Encounters:   10/20/17 120/70   10/04/17 (P) 137/81   08/28/17 118/62    Weight from Last 3 Encounters:   10/20/17 98.1 kg (216 lb 3.2 oz)   10/04/17 97.5 kg (215 lb)   08/28/17 87.8 kg (193 lb 9.6 oz)              Today, you had the following     No orders found for display         Today's Medication Changes          These changes are accurate as of: 11/8/17 11:17 AM.  If you have any questions, ask your nurse or doctor.               These medicines have changed or have updated prescriptions.        Dose/Directions    insulin glargine 100 UNIT/ML injection   Commonly known as:  LANTUS   This may have changed:  how much to take   Used for:  Type 2 diabetes mellitus without complication, with long-term current use of insulin (H)        Dose:  44 Units   Inject 44 Units Subcutaneous At Bedtime   Quantity:  9 mL   Refills:  0                Primary Care Provider Office Phone # Fax #    Bella Isaac [phone]       [address] [place] [address]        Equal Access to Services     DELIA CELAYA AH: Hadii tiki blakeo Sofrancoisali, waaxda luqadaha, qaybta kaalmada adeegyada, raji robin. So [facility] [phone].    ATENCIÓN: Si habla español, tiene a maldonado disposición servicios gratuitos de asistencia lingüística. Llame al [phone].    We comply with applicable federal civil rights laws and [place] laws. We do not discriminate on the basis of race, color, national origin, age, disability, sex, sexual orientation, or gender identity.            Thank you!     Thank you for choosing [facility]  for your care. Our goal is always to provide you with excellent care. Hearing back from our patients is one way we can continue to improve our services. Please take a few minutes to complete the written survey that you  may receive in the mail after your visit with us. Thank you!             Your Updated Medication List - Protect others around you: Learn how to safely use, store and throw away your medicines at www.disposemymeds.org.          This list is accurate as of: 11/8/17 11:17 AM.  Always use your most recent med list.                   Brand Name Dispense Instructions for use Diagnosis    aspirin EC 81 MG EC tablet     30 tablet    Take 1 tablet (81 mg) by mouth daily    Type 2 diabetes mellitus without complication, with long-term current use of insulin (H)       * atorvastatin 40 MG tablet    LIPITOR    90 tablet    Take 1 tablet (40 mg) by mouth daily        * atorvastatin 80 MG tablet    LIPITOR    45 tablet    Take 0.5 tablets (40 mg) by mouth daily    Pure hypercholesterolemia       blood glucose monitoring lancets     270 each    Use to test blood sugar 3 times daily or as directed.    Type 2 diabetes mellitus (H)       blood glucose monitoring meter device kit     1 kit    Use to test blood sugars 4 times daily or as directed.    New onset type 2 diabetes mellitus (H)       blood glucose monitoring test strip    ACCU-CHEK SMARTVIEW    200 each    Use to test blood sugar 6 times daily or as directed.  Ok to substitute alternative if insurance prefers.    New onset type 2 diabetes mellitus (H)       glucose 40 % Gel gel     1 Tube    Take 15-30 g by mouth every 15 minutes as needed for low blood sugar    New onset type 2 diabetes mellitus (H)       HumaLOG KWIKpen 100 UNIT/ML injection   Generic drug:  insulin lispro     45 mL    14 units before main meals and 4 units before snack.,approx 45 units daily    Diabetes mellitus, type 2 (H)       insulin aspart 100 UNIT/ML injection    NovoLOG PEN    15 mL    14 units before main meals and 4 units before snack.    Type 2 diabetes mellitus without complication, with long-term current use of insulin (H)       insulin glargine 100 UNIT/ML injection    LANTUS    9 mL     Inject 44 Units Subcutaneous At Bedtime    Type 2 diabetes mellitus without complication, with long-term current use of insulin (H)       insulin pen needle 32G X 4 MM    BD ASHWINI U/F    200 each    Use 4 daily as directed.    New onset type 2 diabetes mellitus (H)       * Notice:  This list has 2 medication(s) that are the same as other medications prescribed for you. Read the directions carefully, and ask your doctor or other care provider to review them with you.

## 2017-11-10 ENCOUNTER — TELEPHONE (OUTPATIENT)
Dept: EDUCATION SERVICES | Facility: CLINIC | Age: 52
End: 2017-11-10

## 2017-11-10 ENCOUNTER — TELEPHONE (OUTPATIENT)
Dept: INTERNAL MEDICINE | Facility: CLINIC | Age: 52
End: 2017-11-10

## 2017-11-10 DIAGNOSIS — E11.9 NEW ONSET TYPE 2 DIABETES MELLITUS (H): Primary | ICD-10-CM

## 2017-11-10 NOTE — TELEPHONE ENCOUNTER
Diabetes Follow-up    Subjective/Objective:    David Santos sent in blood glucose log for review. Last date of communication was: .    Diabetes is being managed with Lifestyle (diet/activity) and medications:  Diabetes Medication(s)     Diabetic Other Sig    glucose 40 % GEL gel Take 15-30 g by mouth every 15 minutes as needed for low blood sugar    Insulin Sig    HUMALOG KWIKPEN 100 UNIT/ML soln 14 units before main meals and 5 units before snack.,approx 45 units daily    insulin aspart (NOVOLOG PEN) 100 UNIT/ML injection 14 units before main meals and 4 units before snack.    insulin glargine (LANTUS) 100 UNIT/ML injection Inject 44 Units Subcutaneous At Bedtime     Patient taking differently:  Inject 30 Units Subcutaneous At Bedtime           BG/Food Lo/9    fasting 287   Before lunch 113   Before dinner 216    Did not test 2 hours after any meals and forgot to take his humalog with his evening snack on  so his fasting was elevated yesterday.     Assessment:    Fasting blood glucose: 0% in target.  Before lunch glucose: 100% in target.  Before dinner glucose: 0% in target.    Plan/Response:  Instructed pt to test before and 2 hours after meals to better assess meal time insulin dosing and if it should be adjusted. He plans to do this over the weekend. He is then to call us next week (monday or Tuesday) with those numbers. Reports he has the number to call. No dose adjustment made today.     MANDIE Feliz CDE      Any diabetes medication dose changes were made via the CDE Protocol and Collaborative Practice Agreement with the patient's referring provider. A copy of this encounter was shared with the provider.

## 2017-11-11 RX ORDER — LANCETS
EACH MISCELLANEOUS
Qty: 100 EACH | Refills: 6 | Status: SHIPPED | OUTPATIENT
Start: 2017-11-11

## 2017-11-11 RX ORDER — GLUCOSAMINE HCL/CHONDROITIN SU 500-400 MG
CAPSULE ORAL
Qty: 100 EACH | Refills: 3 | Status: SHIPPED | OUTPATIENT
Start: 2017-11-11

## 2017-11-13 ENCOUNTER — TELEPHONE (OUTPATIENT)
Dept: INTERNAL MEDICINE | Facility: CLINIC | Age: 52
End: 2017-11-13

## 2017-11-13 NOTE — TELEPHONE ENCOUNTER
Reason for Call:  Medication or medication refill:    Do you use a [facility]?  Name of the pharmacy and phone number for the current request:  [facility] [phone]    Name of the medication requested:     Other request: Clarification on 3 medications    Can we leave a detailed message on this number? YES    Phone number patient can be reached at: Other phone number:  [facility]  [phone]    Best Time: anytime    Call taken on 11/13/2017 at 2:21 PM by MARTHA PHAM

## 2017-11-13 NOTE — TELEPHONE ENCOUNTER
Talked to pharmacist, they want to know how often the pt is checking Blood sugars.    Advised that he is checking it 4 times daily.

## 2017-11-14 ENCOUNTER — VIRTUAL VISIT (OUTPATIENT)
Dept: EDUCATION SERVICES | Facility: CLINIC | Age: 52
End: 2017-11-14

## 2017-11-14 DIAGNOSIS — E11.9 NEW ONSET TYPE 2 DIABETES MELLITUS (H): Primary | ICD-10-CM

## 2017-11-14 DIAGNOSIS — E11.9 DIABETES MELLITUS, TYPE 2 (H): ICD-10-CM

## 2017-11-14 RX ORDER — INSULIN LISPRO 100 [IU]/ML
INJECTION, SOLUTION INTRAVENOUS; SUBCUTANEOUS
Qty: 45 ML | Refills: 3 | Status: SHIPPED | OUTPATIENT
Start: 2017-11-14

## 2017-11-14 NOTE — Clinical Note
FYI, increased humalog for evening snack by 1 unit (to 6 units) as fasting BG still elevated following evening snacks.  I think his before meal high numbers are r/t food currently as these are increased for him so will address this at our next visit. Thanks! Bibi Maxwell, MANDIE LD CDE

## 2017-11-14 NOTE — MR AVS SNAPSHOT
"              After Visit Summary   11/14/2017    David Santos    MRN: [id number]           Patient Information     Date Of Birth          1965        Visit Information        Provider Department      11/14/2017 8:00 AM CS DIABETIC ED RESOURCE [facility]        Today's Diagnoses     New onset type 2 diabetes mellitus (H)    -  1    Diabetes mellitus, type 2 (H)           Follow-ups after your visit        Your next 10 appointments already scheduled     Dec 05, 2017  9:00 AM CST   Diabetic Education with CS DIABETIC ED RESOURCE   [facility]    [address] [place] [id number] [phone]            Jan 05, 2018  9:00 AM CST   (Arrive by 8:45 AM)   RETURN DIABETES with Geraldine Dillon MD   [facility] End[facility] ([facility])    [address]  3rd Floor  [facility] [address] [phone]              Who to contact     If you have questions or need follow up information about today's clinic visit or your schedule please contact [facility] directly at [phone].  Normal or non-critical lab and imaging results will be communicated to you by [employer], letter or phone within 4 business days after the clinic has received the results. If you do not hear from us within 7 days, please contact the clinic through [employer] or phone. If you have a critical or abnormal lab result, we will notify you by phone as soon as possible.  Submit refill requests through [employer] or call your pharmacy and they will forward the refill request to us. Please allow 3 business days for your refill to be completed.          Additional Information About Your Visit        [employer] Information     [employer] lets you send messages to your doctor, view your test results, renew your prescriptions, schedule appointments and more. To sign up, go to [email or web address] . Click on \"Log in\" on the left side of the screen, " "which will take you to the Welcome page. Then click on \"Sign up Now\" on the right side of the page.     You will be asked to enter the access code listed below, as well as some personal information. Please follow the directions to create your username and password.     Your access code is: [email or web address]  Expires: 2017 11:46 AM     Your access code will  in 90 days. If you need help or a new code, please call your [place] clinic or [phone].        Care EveryWhere ID     This is your Care EveryWhere ID. This could be used by other organizations to access your [place] medical records  [id number]         Blood Pressure from Last 3 Encounters:   10/20/17 120/70   10/04/17 (P) 137/81   17 118/62    Weight from Last 3 Encounters:   10/20/17 98.1 kg (216 lb 3.2 oz)   10/04/17 97.5 kg (215 lb)   17 87.8 kg (193 lb 9.6 oz)              Today, you had the following     No orders found for display         Today's Medication Changes          These changes are accurate as of: 17 10:30 AM.  If you have any questions, ask your nurse or doctor.               These medicines have changed or have updated prescriptions.        Dose/Directions    HumaLOG KWIKpen 100 UNIT/ML injection   This may have changed:  additional instructions   Used for:  Diabetes mellitus, type 2 (H)   Generic drug:  insulin lispro        14 units before main meals and 6 units before snack.,approx 45 units daily   Quantity:  45 mL   Refills:  3       insulin glargine 100 UNIT/ML injection   Commonly known as:  LANTUS   This may have changed:  how much to take   Used for:  Type 2 diabetes mellitus without complication, with long-term current use of insulin (H)        Dose:  44 Units   Inject 44 Units Subcutaneous At Bedtime   Quantity:  9 mL   Refills:  0            Where to get your medicines      These medications were sent to [id number] - [place], [place] - [address]  [address] " [place] [address]     Phone:  [phone]     HumaLOG KWIKpen 100 UNIT/ML injection                Primary Care Provider Office Phone # Fax #    Bella Isaac [phone]       [address] [facility] [address]        Equal Access to Services     DELIA CELAYA : Hadii aad ku hadasho Soomaali, waaxda luqadaha, qaybta kaalmada adeegyada, waxay idiin haygerbern ademacie english lavicky robin. So [facility] [phone].    ATENCIÓN: Si habla español, tiene a maldonado disposición servicios gratuitos de asistencia lingüística. [facility] [phone].    We comply with applicable federal civil rights laws and [place] laws. We do not discriminate on the basis of race, color, national origin, age, disability, sex, sexual orientation, or gender identity.            Thank you!     Thank you for choosing [facility]  for your care. Our goal is always to provide you with excellent care. Hearing back from our patients is one way we can continue to improve our services. Please take a few minutes to complete the written survey that you may receive in the mail after your visit with us. Thank you!             Your Updated Medication List - Protect others around you: Learn how to safely use, store and throw away your medicines at www.disposemymeds.org.          This list is accurate as of: 11/14/17 10:30 AM.  Always use your most recent med list.                   Brand Name Dispense Instructions for use Diagnosis    alcohol swab prep pads     100 each    Use to swab area of injection/dean as directed.    New onset type 2 diabetes mellitus (H)       aspirin EC 81 MG EC tablet     30 tablet    Take 1 tablet (81 mg) by mouth daily    Type 2 diabetes mellitus without complication, with long-term current use of insulin (H)       * atorvastatin 40 MG tablet    LIPITOR    90 tablet    Take 1 tablet (40 mg) by mouth daily        * atorvastatin 80 MG tablet    LIPITOR    45 tablet    Take 0.5 tablets (40 mg) by mouth daily    Pure  hypercholesterolemia       blood glucose calibration solution    NO BRAND SPECIFIED    1 Bottle    To accompany: Blood Glucose Monitor Brands: per insurance.    New onset type 2 diabetes mellitus (H)       * blood glucose monitoring lancets     270 each    Use to test blood sugar 3 times daily or as directed.    Type 2 diabetes mellitus (H)       * thin lancets    NO BRAND SPECIFIED    100 each    Use with lanceting device. To accompany: Blood Glucose Monitor Brands: per insurance.    New onset type 2 diabetes mellitus (H)       * blood glucose monitoring meter device kit     1 kit    Use to test blood sugars 4 times daily or as directed.    New onset type 2 diabetes mellitus (H)       * blood glucose monitoring meter device kit    no brand specified    1 kit    Use to test BG 4x/daily. Preferred BG meter OR supplies to accompany: BG Monitor Brands: per insurance.    New onset type 2 diabetes mellitus (H)       * blood glucose monitoring test strip    ACCU-CHEK SMARTVIEW    200 each    Use to test blood sugar 6 times daily or as directed.  Ok to substitute alternative if insurance prefers.    New onset type 2 diabetes mellitus (H)       * blood glucose monitoring test strip    no brand specified    100 strip    Use to test blood sugar 4 times daily or as directed. To accompany: Blood Glucose Monitor Brands: per insurance.    New onset type 2 diabetes mellitus (H)       glucose 40 % Gel gel     1 Tube    Take 15-30 g by mouth every 15 minutes as needed for low blood sugar    New onset type 2 diabetes mellitus (H)       HumaLOG KWIKpen 100 UNIT/ML injection   Generic drug:  insulin lispro     45 mL    14 units before main meals and 6 units before snack.,approx 45 units daily    Diabetes mellitus, type 2 (H)       insulin aspart 100 UNIT/ML injection    NovoLOG PEN    15 mL    14 units before main meals and 4 units before snack.    Type 2 diabetes mellitus without complication, with long-term current use of insulin (H)        insulin glargine 100 UNIT/ML injection    LANTUS    9 mL    Inject 44 Units Subcutaneous At Bedtime    Type 2 diabetes mellitus without complication, with long-term current use of insulin (H)       insulin pen needle 32G X 4 MM    BD ASHWINI U/F    200 each    Use 4 daily as directed.    New onset type 2 diabetes mellitus (H)       * Notice:  This list has 8 medication(s) that are the same as other medications prescribed for you. Read the directions carefully, and ask your doctor or other care provider to review them with you.

## 2017-11-14 NOTE — PROGRESS NOTES
Diabetes Follow-up    Subjective/Objective:    David Santos sent in blood glucose log for review. Last date of communication was: 11/10/17.    Diabetes is being managed with Lifestyle (diet/activity) and insulin: Lantus 0-0-0-30 and Humalog 14-14-14-5 + sliding scale    BG/Food Log:   Date Breakfast  Lunch  Dinner  Bedtime    Before After Before After Before After    11/13 289  246 188 177  252   11/12 188 178 210  221  147   11/11     241 355        Assessment:    Fasting blood glucose: 0% in target. (however he has had a snack each day before bed)  After breakfast glucose: 100% in target.  Before lunch glucose: 0% in target.  After lunch glucose: 0% in target.  Before dinner glucose: 0% in target.  After dinner glucose: 0% in target.  Bedtime glucose: 50% in target.    Pt's fasting blood sugars remain elevated r/t him having a bedtime snack so will increase his humalog with his bedtime snack. His before lunch and dinner numbers have been elevated the past few days which is new for him but he mentioned eating quite a bit more recently so suspect these are food related. Therefore, will not adjust lantus dose at this time.      Plan/Response:  Will review diet more in detail at next clinic visit (scheduled 12/5)  Will call next week to review blood sugars.   Explained to just have him check occasional 2 hour post meal numbers so he does not run out of testing supplies.   Recommend increase to insulin - Increase humalog by 1 unit with bedtime snack to 6 units.     MANDIE Feliz CDE      Any diabetes medication dose changes were made via the CDE Protocol and Collaborative Practice Agreement with the patient's referring provider. A copy of this encounter was shared with the provider.

## 2017-11-21 ENCOUNTER — TELEPHONE (OUTPATIENT)
Dept: EDUCATION SERVICES | Facility: CLINIC | Age: 52
End: 2017-11-21

## 2017-11-21 NOTE — TELEPHONE ENCOUNTER
Called pt today to review his blood sugars. Reports it is not a good time. Will call next week per pt preference.     Bibi Maxwell RD LD CDE

## 2017-11-27 ENCOUNTER — TELEPHONE (OUTPATIENT)
Dept: EDUCATION SERVICES | Facility: CLINIC | Age: 52
End: 2017-11-27

## 2017-11-27 NOTE — TELEPHONE ENCOUNTER
"Diabetes Follow-up    Subjective/Objective:    David Santos sent in blood glucose log for review. Last date of communication was: 11/21/17.    Diabetes is being managed with Lifestyle (diet/activity) and Humalog 14-14-14-6 and Lantus 0-0-0-30    BG/Food Log:   Date Breakfast  Lunch  Dinner  Bedtime    Before After Before After Before After    11/27   147       11/26 150    110  91   11/25  209 149  93     11/24    221          Assessment:    Fasting blood glucose: 0% in target.  After breakfast glucose: 0% in target.  Before lunch glucose: 0% in target.  After lunch glucose: 0% in target.  Before dinner glucose: 100% in target.  After dinner glucose: NA in target.  Bedtime glucose: 100% in target.    Overall, his blood sugars are improving. When originally asked if he takes his humalog with each meal he said yes but then he reported not taking insulin if his blood sugars are \"low\". Therefore, it appears he is possibly not taking his mealtime insulin if his numbers are in the 90's because then he is afraid of going low after.  Educated that he does need insulin with meals but that he should check before and 2 hours after meals so that his mealtime dosing can be adjusted if needed.     Plan/Response:  No changes in the patient's current treatment plan  Again encouraged pt to test before and 2 hours after meals so we can reassess and adjust his mealtime humalog as needed. He verbalized understanding and will try to do this before our appointment next week. As it is unclear when he is exactly taking insulin at this point, did not make any changes today. Will reassess next week when he hopefully has more numbers.     Bibi Maxwell RD LD CDE      Any diabetes medication dose changes were made via the CDE Protocol and Collaborative Practice Agreement with the patient's referring provider. A copy of this encounter was shared with the provider.      "

## 2017-12-09 ENCOUNTER — NURSE TRIAGE (OUTPATIENT)
Dept: NURSING | Facility: CLINIC | Age: 52
End: 2017-12-09

## 2017-12-09 NOTE — TELEPHONE ENCOUNTER
Patient states he will be out of HUMALOG KWIKPEN before 12/11/17 and pharmacy told him they would not be able to refill.  [facility] called [id number] and spoke with pharmacist, Amelie; states can fill what have on hand and should have the remainder in stock by 12/12/17.  [facility] notified patient of above.

## 2017-12-18 ENCOUNTER — OFFICE VISIT (OUTPATIENT)
Dept: INTERNAL MEDICINE | Facility: CLINIC | Age: 52
End: 2017-12-18
Payer: COMMERCIAL

## 2017-12-18 VITALS
HEIGHT: 72 IN | WEIGHT: 221.9 LBS | SYSTOLIC BLOOD PRESSURE: 124 MMHG | TEMPERATURE: 98 F | DIASTOLIC BLOOD PRESSURE: 80 MMHG | OXYGEN SATURATION: 96 % | HEART RATE: 83 BPM | BODY MASS INDEX: 30.05 KG/M2

## 2017-12-18 DIAGNOSIS — S61.412A LACERATION OF LEFT HAND WITHOUT FOREIGN BODY, INITIAL ENCOUNTER: ICD-10-CM

## 2017-12-18 DIAGNOSIS — Z79.4 TYPE 2 DIABETES MELLITUS WITH DIABETIC POLYNEUROPATHY, WITH LONG-TERM CURRENT USE OF INSULIN (H): Primary | ICD-10-CM

## 2017-12-18 DIAGNOSIS — E11.42 TYPE 2 DIABETES MELLITUS WITH DIABETIC POLYNEUROPATHY, WITH LONG-TERM CURRENT USE OF INSULIN (H): Primary | ICD-10-CM

## 2017-12-18 DIAGNOSIS — G44.219 EPISODIC TENSION-TYPE HEADACHE, NOT INTRACTABLE: ICD-10-CM

## 2017-12-18 PROBLEM — E11.9 NEW ONSET TYPE 2 DIABETES MELLITUS (H): Status: RESOLVED | Noted: 2017-08-22 | Resolved: 2017-12-18

## 2017-12-18 PROCEDURE — 99214 OFFICE O/P EST MOD 30 MIN: CPT | Performed by: INTERNAL MEDICINE

## 2017-12-18 RX ORDER — GABAPENTIN 300 MG/1
300 CAPSULE ORAL AT BEDTIME
Qty: 90 CAPSULE | Refills: 3 | Status: SHIPPED | OUTPATIENT
Start: 2017-12-18

## 2017-12-18 NOTE — MR AVS SNAPSHOT
After Visit Summary   12/18/2017    David Santos    MRN: [id number]           Patient Information     Date Of Birth          1965        Visit Information        Provider Department      12/18/2017 3:30 PM Bella Isaac MD [facility]        Today's Diagnoses     Episodic tension-type headache, not intractable    -  1    Type 2 diabetes mellitus with diabetic polyneuropathy, with long-term current use of insulin (H)          Care Instructions    Schedule fasting labs on the way out.    ---    Recommendations to follow.    ---    Gabapentin 300mg at night to help with numbness and tingling.          Follow-ups after your visit        Your next 10 appointments already scheduled     Jan 05, 2018  9:00 AM CST   (Arrive by 8:45 AM)   RETURN DIABETES with Geraldine Dillon MD   [facility] ([facility])    [address] [place] [address] [phone]              Future tests that were ordered for you today     Open Future Orders        Priority Expected Expires Ordered    CBC with platelets Routine  12/18/2018 12/18/2017    Comprehensive metabolic panel Routine  12/18/2018 12/18/2017    Lipid Profile Routine  12/18/2018 12/18/2017    Hemoglobin A1c Routine  12/18/2018 12/18/2017    Magnesium Routine  12/18/2018 12/18/2017    Phosphorus Routine  12/18/2018 12/18/2017            Who to contact     If you have questions or need follow up information about today's clinic visit or your schedule please contact [facility] directly at [phone].  Normal or non-critical lab and imaging results will be communicated to you by MyChart, letter or phone within 4 business days after the clinic has received the results. If you do not hear from us within 7 days, please contact the clinic through MyChart or phone. If you have a critical or abnormal lab result, we will notify you by phone as soon as  "possible.  Submit refill requests through [employer] or call your pharmacy and they will forward the refill request to us. Please allow 3 business days for your refill to be completed.          Additional Information About Your Visit        [employer] Information     [employer] lets you send messages to your doctor, view your test results, renew your prescriptions, schedule appointments and more. To sign up, go to [email or web address] . Click on \"Log in\" on the left side of the screen, which will take you to the Welcome page. Then click on \"Sign up Now\" on the right side of the page.     You will be asked to enter the access code listed below, as well as some personal information. Please follow the directions to create your username and password.     Your access code is: [email or web address]  Expires: 3/6/2018  7:42 AM     Your access code will  in 90 days. If you need help or a new code, please call your [facility] clinic or [phone].        Care EveryWhere ID     This is your Care EveryWhere ID. This could be used by other organizations to access your [facility] medical records  [id number]        Your Vitals Were     Pulse Temperature Height Pulse Oximetry BMI (Body Mass Index)       83 98  F (36.7  C) (Oral) 6' (1.829 m) 96% 30.1 kg/m2        Blood Pressure from Last 3 Encounters:   17 124/80   10/20/17 120/70   10/04/17 (P) 137/81    Weight from Last 3 Encounters:   17 221 lb 14.4 oz (100.7 kg)   10/20/17 216 lb 3.2 oz (98.1 kg)   10/04/17 215 lb (97.5 kg)                 Today's Medication Changes          These changes are accurate as of: 17  3:59 PM.  If you have any questions, ask your nurse or doctor.               Start taking these medicines.        Dose/Directions    gabapentin 300 MG capsule   Commonly known as:  NEURONTIN   Used for:  Type 2 diabetes mellitus with diabetic polyneuropathy, with long-term current use of insulin (H)   Started by:  Bella Isaac MD        Dose:  300 mg "   Take 1 capsule (300 mg) by mouth At Bedtime   Quantity:  90 capsule   Refills:  3         These medicines have changed or have updated prescriptions.        Dose/Directions    insulin glargine 100 UNIT/ML injection   Commonly known as:  LANTUS   This may have changed:  how much to take   Used for:  Type 2 diabetes mellitus without complication, with long-term current use of insulin (H)        Dose:  44 Units   Inject 44 Units Subcutaneous At Bedtime   Quantity:  9 mL   Refills:  0            Where to get your medicines      These medications were sent to [id number] - [place], [place] - [address]  [address][1] [id number]     Phone:  [phone]     gabapentin 300 MG capsule                Primary Care Provider Office Phone # Fax #    Bella Isaac [phone]       [address] [facility] [address]        Equal Access to Services     DELIA CELAYA : Hadii tiki ku hadasho Soomaali, waaxda luqadaha, qaybta kaalmada adeegyada, waxay idiin hayaan valerio gordon . So [facility] [phone].    ATENCIÓN: Si habla español, tiene a maldonado disposición servicios gratuitos de asistencia lingüística. Llame al [phone].    We comply with applicable federal civil rights laws and [place] laws. We do not discriminate on the basis of race, color, national origin, age, disability, sex, sexual orientation, or gender identity.            Thank you!     Thank you for choosing [facility]  for your care. Our goal is always to provide you with excellent care. Hearing back from our patients is one way we can continue to improve our services. Please take a few minutes to complete the written survey that you may receive in the mail after your visit with us. Thank you!             Your Updated Medication List - Protect others around you: Learn how to safely use, store and throw away your medicines at www.disposemymeds.org.          This list is  accurate as of: 12/18/17  3:59 PM.  Always use your most recent med list.                   Brand Name Dispense Instructions for use Diagnosis    alcohol swab prep pads     100 each    Use to swab area of injection/dean as directed.    New onset type 2 diabetes mellitus (H)       aspirin EC 81 MG EC tablet     30 tablet    Take 1 tablet (81 mg) by mouth daily    Type 2 diabetes mellitus without complication, with long-term current use of insulin (H)       atorvastatin 80 MG tablet    LIPITOR    45 tablet    Take 0.5 tablets (40 mg) by mouth daily    Pure hypercholesterolemia       blood glucose calibration solution    NO BRAND SPECIFIED    1 Bottle    To accompany: Blood Glucose Monitor Brands: per insurance.    New onset type 2 diabetes mellitus (H)       * blood glucose monitoring lancets     270 each    Use to test blood sugar 3 times daily or as directed.    Type 2 diabetes mellitus (H)       * thin lancets    NO BRAND SPECIFIED    100 each    Use with lanceting device. To accompany: Blood Glucose Monitor Brands: per insurance.    New onset type 2 diabetes mellitus (H)       * blood glucose monitoring meter device kit     1 kit    Use to test blood sugars 4 times daily or as directed.    New onset type 2 diabetes mellitus (H)       * blood glucose monitoring meter device kit    no brand specified    1 kit    Use to test BG 4x/daily. Preferred BG meter OR supplies to accompany: BG Monitor Brands: per insurance.    New onset type 2 diabetes mellitus (H)       * blood glucose monitoring test strip    ACCU-CHEK SMARTVIEW    200 each    Use to test blood sugar 6 times daily or as directed.  Ok to substitute alternative if insurance prefers.    New onset type 2 diabetes mellitus (H)       * blood glucose monitoring test strip    no brand specified    100 strip    Use to test blood sugar 4 times daily or as directed. To accompany: Blood Glucose Monitor Brands: per insurance.    New onset type 2 diabetes mellitus (H)        gabapentin 300 MG capsule    NEURONTIN    90 capsule    Take 1 capsule (300 mg) by mouth At Bedtime    Type 2 diabetes mellitus with diabetic polyneuropathy, with long-term current use of insulin (H)       glucose 40 % Gel gel     1 Tube    Take 15-30 g by mouth every 15 minutes as needed for low blood sugar    New onset type 2 diabetes mellitus (H)       HumaLOG KWIKpen 100 UNIT/ML injection   Generic drug:  insulin lispro     45 mL    14 units before main meals and 6 units before snack.,approx 45 units daily    Diabetes mellitus, type 2 (H)       insulin glargine 100 UNIT/ML injection    LANTUS    9 mL    Inject 44 Units Subcutaneous At Bedtime    Type 2 diabetes mellitus without complication, with long-term current use of insulin (H)       insulin pen needle 32G X 4 MM    BD ASHWINI U/F    200 each    Use 4 daily as directed.    New onset type 2 diabetes mellitus (H)       * Notice:  This list has 6 medication(s) that are the same as other medications prescribed for you. Read the directions carefully, and ask your doctor or other care provider to review them with you.

## 2017-12-18 NOTE — NURSING NOTE
Chief Complaint   Patient presents with     Diabetes     Follow up. Tingling sensation on both hands and feeet x couple of weeks.     Headache     x couple of weeks.       Initial /80 (BP Location: Left arm, Patient Position: Chair, Cuff Size: Adult Large)  Pulse 83  Temp 98  F (36.7  C) (Oral)  Ht 6' (1.829 m)  Wt 221 lb 14.4 oz (100.7 kg)  SpO2 96%  BMI 30.1 kg/m2 Estimated body mass index is 30.1 kg/(m^2) as calculated from the following:    Height as of this encounter: 6' (1.829 m).    Weight as of this encounter: 221 lb 14.4 oz (100.7 kg).  Medication Reconciliation: complete     Kaminibose MA

## 2017-12-18 NOTE — PROGRESS NOTES
"  SUBJECTIVE:                                                      HPI: David Santos is a pleasant 52 year old male who presents for diabetes follow-up:     DIABETES MANAGEMENT                                                      Date of last DM check: 10/20/17    Current DM regimen: Lantus 30 units at bedtime; Humalog 14 units with meals, 6 units before bed  Adherent to regimen: yes  Adherent to ADA diet: yes  Fasting BGs:130s-180s  Pre-meal BGs: 70s-220s  QHS BGs: 110s - 190s  Episodes of hypoglycemia: no     Ophthalmology: saw [facility] 9/22/17   Retinopathy: no   Neuropathy: numbness and tingling bilateral hands and feet - new and bothersome, especially at night  Nephropathy: no   Checking feet/seeing podiatry: no   Influenza vaccine: up to date   Pneumococcal vaccine: up to date     Aspirin Rx: aspirin 81mg daily  ACE-I/ARB: not indicated at this time (not hypertensive and no proteinuria)  Statin: on atorvastatin 40mg daily    Last HgbA1c: >16.0% (8/22/17)  Last LDL: has not been checked    ---    Patient also complains of headaches:  - ongoing for last couple of weeks  - occur daily, mostly after supper  - last 1-2 hours and resolve on their own  - frontal in location  - like a \"vice \" in quality  - moderate in severity    - no associated nausea or vomiting  - no recent head trauma  - no unusual or new stressors  - no recent changes to medications  - no sinus symptoms, sneezing, or coughing    Has not tried any OTC medications.     ---    Finally, patient was cut on a \"\" at work:  - occurred this morning  - laceration to left 1st MCP  - significant pain and bleeding at first - both now resolved    - no thumb numbness, tingling, weakness, or loss of range of motion    Tetanus booster up to date (given this past summer).     ---    The medication, allergy, and problem lists have been reviewed and updated as appropriate.       OBJECTIVE:                                                      /80 " (BP Location: Left arm, Patient Position: Chair, Cuff Size: Adult Large)  Pulse 83  Temp 98  F (36.7  C) (Oral)  Ht 6' (1.829 m)  Wt 221 lb 14.4 oz (100.7 kg)  SpO2 96%  BMI 30.1 kg/m2  Constitutional: well-appearing  Psych: normal judgment and insight; normal mood and affect; recent and remote memory intact    1cm clean edge laceration, to depth of muscle, over left 1st MCP; no bleeding or discharge; no surrounding warmth, redness, or swelling    Area cleaned with hydrogen peroxide.       ASSESSMENT/PLAN:                                                      (E11.42,  Z79.4) Type 2 diabetes mellitus with diabetic polyneuropathy, with long-term current use of insulin (H)  (primary encounter diagnosis)  Comment: patient now reporting numbness/tingling of extremities.   Plan:    - CBC, CMP, HgbA1c, fasting lipid profile ordered - patient will obtain tomorrow.    - TRIAL of gabapentin 300mg qhs.   - follow-up in 3 months (earlier as needed).     (G44.219) Episodic tension-type headache, not intractable  Comment: etiology unclear - may be tension or sinus headache; description less consistent with migraine/cluster HAs.   Plan:    - CBC, CMP, mag, and phos with fasting labs tomorrow.   - if labs unrevealing, recommend trial of NSAIDs for pain.     (S61.098A) Laceration of left hand without foreign body, initial encounter  Comment:    - wound appears clean.    - no benefit to primary closure at this time.   Plan:    - keep clean and covered (+/- Bacitracin) with bandage until scab forms.   - if redness, swelling, warmth, pain, bleeding, discharge, fevers, or chills develop, contact MD.      The instructions on the AVS were discussed and explained to the patient. Patient expressed understanding of instructions.    A total of 25 minutes were spent face-to-face with this patient during this encounter and over half of that time was spent on counseling and coordination of care re: above diagnoses and plans of care.      (Chart documentation was completed, in part, with [employer] voice-recognition software. Even though reviewed, some grammatical, spelling, and word errors may remain.)    Bella Isaac MD   [address] [address]  T: [phone], F: [phone]

## 2017-12-18 NOTE — PATIENT INSTRUCTIONS
Schedule fasting labs on the way out.    ---    Recommendations to follow.    ---    Gabapentin 300mg at night to help with numbness and tingling.

## 2017-12-19 DIAGNOSIS — G44.219 EPISODIC TENSION-TYPE HEADACHE, NOT INTRACTABLE: ICD-10-CM

## 2017-12-19 DIAGNOSIS — E11.42 TYPE 2 DIABETES MELLITUS WITH DIABETIC POLYNEUROPATHY, WITH LONG-TERM CURRENT USE OF INSULIN (H): ICD-10-CM

## 2017-12-19 DIAGNOSIS — Z79.4 TYPE 2 DIABETES MELLITUS WITH DIABETIC POLYNEUROPATHY, WITH LONG-TERM CURRENT USE OF INSULIN (H): ICD-10-CM

## 2017-12-19 LAB
ALBUMIN SERPL-MCNC: 3.9 G/DL (ref 3.4–5)
ALP SERPL-CCNC: 128 U/L (ref 40–150)
ALT SERPL W P-5'-P-CCNC: 93 U/L (ref 0–70)
ANION GAP SERPL CALCULATED.3IONS-SCNC: 7 MMOL/L (ref 3–14)
AST SERPL W P-5'-P-CCNC: 32 U/L (ref 0–45)
BILIRUB SERPL-MCNC: 0.3 MG/DL (ref 0.2–1.3)
BUN SERPL-MCNC: 11 MG/DL (ref 7–30)
CALCIUM SERPL-MCNC: 8.9 MG/DL (ref 8.5–10.1)
CHLORIDE SERPL-SCNC: 108 MMOL/L (ref 94–109)
CHOLEST SERPL-MCNC: 204 MG/DL
CO2 SERPL-SCNC: 24 MMOL/L (ref 20–32)
CREAT SERPL-MCNC: 0.74 MG/DL (ref 0.66–1.25)
ERYTHROCYTE [DISTWIDTH] IN BLOOD BY AUTOMATED COUNT: 12.4 % (ref 10–15)
GFR SERPL CREATININE-BSD FRML MDRD: >90 ML/MIN/1.7M2
GLUCOSE SERPL-MCNC: 213 MG/DL (ref 70–99)
HBA1C MFR BLD: 7.4 % (ref 4.3–6)
HCT VFR BLD AUTO: 44.9 % (ref 40–53)
HDLC SERPL-MCNC: 65 MG/DL
HGB BLD-MCNC: 14.8 G/DL (ref 13.3–17.7)
LDLC SERPL CALC-MCNC: 123 MG/DL
MAGNESIUM SERPL-MCNC: 2.1 MG/DL (ref 1.6–2.3)
MCH RBC QN AUTO: 30.5 PG (ref 26.5–33)
MCHC RBC AUTO-ENTMCNC: 33 G/DL (ref 31.5–36.5)
MCV RBC AUTO: 92 FL (ref 78–100)
NONHDLC SERPL-MCNC: 139 MG/DL
PHOSPHATE SERPL-MCNC: 2.7 MG/DL (ref 2.5–4.5)
PLATELET # BLD AUTO: 320 10E9/L (ref 150–450)
POTASSIUM SERPL-SCNC: 4.1 MMOL/L (ref 3.4–5.3)
PROT SERPL-MCNC: 7.6 G/DL (ref 6.8–8.8)
RBC # BLD AUTO: 4.86 10E12/L (ref 4.4–5.9)
SODIUM SERPL-SCNC: 139 MMOL/L (ref 133–144)
TRIGL SERPL-MCNC: 81 MG/DL
WBC # BLD AUTO: 5.9 10E9/L (ref 4–11)

## 2017-12-19 PROCEDURE — 83735 ASSAY OF MAGNESIUM: CPT | Performed by: INTERNAL MEDICINE

## 2017-12-19 PROCEDURE — 80053 COMPREHEN METABOLIC PANEL: CPT | Performed by: INTERNAL MEDICINE

## 2017-12-19 PROCEDURE — 84100 ASSAY OF PHOSPHORUS: CPT | Performed by: INTERNAL MEDICINE

## 2017-12-19 PROCEDURE — 80061 LIPID PANEL: CPT | Performed by: INTERNAL MEDICINE

## 2017-12-19 PROCEDURE — 83036 HEMOGLOBIN GLYCOSYLATED A1C: CPT | Performed by: INTERNAL MEDICINE

## 2017-12-19 PROCEDURE — 85027 COMPLETE CBC AUTOMATED: CPT | Performed by: INTERNAL MEDICINE

## 2017-12-19 PROCEDURE — 36415 COLL VENOUS BLD VENIPUNCTURE: CPT | Performed by: INTERNAL MEDICINE

## 2018-01-19 ENCOUNTER — TELEPHONE (OUTPATIENT)
Dept: CARE COORDINATION | Facility: CLINIC | Age: 53
End: 2018-01-19

## 2018-01-19 NOTE — TELEPHONE ENCOUNTER
Clinic Care Coordination RN :      Incoming call from the patient .  FYI patient will be switching to a [facility] in February     Isha Aviles RN / Clinical Care Coordinator     [address] [address]  [email or web address]  Office :  [phone] / Fax :  [phone]

## 2018-09-01 ENCOUNTER — HEALTH MAINTENANCE LETTER (OUTPATIENT)
Age: 53
End: 2018-09-01

## 2018-10-11 ENCOUNTER — MEDICAL CORRESPONDENCE (OUTPATIENT)
Dept: HEALTH INFORMATION MANAGEMENT | Facility: CLINIC | Age: 53
End: 2018-10-11

## 2019-04-28 DIAGNOSIS — E11.9 NEW ONSET TYPE 2 DIABETES MELLITUS (H): ICD-10-CM

## 2019-04-28 NOTE — LETTER
[facility]  [address] [address]  [phone]            David Salazarton  [address]  [facility] [address]        April 30, 2019    Dear David,    While refilling your prescription today, we noticed that you are due for an appointment with your provider.  We will refill your prescription for 30 days, but a follow-up appointment must be made before any additional refills can be approved.     Taking care of your health is important to us and we look forward to seeing you in the near future.  Please call us at [phone] or [phone] (or use [employer] Talk) to schedule an appointment.     Please disregard this notice if you have already made an appointment.    Sincerely,        [facility]

## 2019-04-28 NOTE — TELEPHONE ENCOUNTER
"Requested Prescriptions   Pending Prescriptions Disp Refills     insulin pen needle (BD ASHWINI U/F) 32G X 4 MM miscellaneous [Pharmacy Med Name: BD Pen Needle Ashwini U/F Miscellaneous 32G X 4 MM] 100 each 5     Sig: USE 4 TIMES A DAY   Last Written Prescription Date:  9/13/2017  Last Fill Quantity: 200,  # refills: 6   Last office visit: Department has no specialty with prescribing provider:  12/18/2017   Future Office Visit:        Diabetic Supplies Protocol Failed - 4/28/2019  4:43 PM        Failed - Recent (6 mo) or future (30 days) visit within the authorizing provider's specialty     Patient had office visit in the last 6 months or has a visit in the next 30 days with authorizing provider.  See \"Patient Info\" tab in inbasket, or \"Choose Columns\" in Meds & Orders section of the refill encounter.            Passed - Medication is active on med list        Passed - Patient is 18 years of age or older          "

## 2019-08-19 ENCOUNTER — HOSPITAL ENCOUNTER (EMERGENCY)
Facility: CLINIC | Age: 54
Discharge: HOME | End: 2019-08-19
Attending: EMERGENCY MEDICINE | Admitting: EMERGENCY MEDICINE
Payer: COMMERCIAL

## 2019-08-19 VITALS
OXYGEN SATURATION: 98 % | RESPIRATION RATE: 16 BRPM | BODY MASS INDEX: 28.44 KG/M2 | WEIGHT: 210 LBS | HEART RATE: 81 BPM | SYSTOLIC BLOOD PRESSURE: 138 MMHG | TEMPERATURE: 98.4 F | DIASTOLIC BLOOD PRESSURE: 98 MMHG | HEIGHT: 72 IN

## 2019-08-19 DIAGNOSIS — L03.116 CELLULITIS OF LEFT LOWER EXTREMITY: ICD-10-CM

## 2019-08-19 PROCEDURE — 99283 EMERGENCY DEPT VISIT LOW MDM: CPT

## 2019-08-19 PROCEDURE — 25000132 ZZH RX MED GY IP 250 OP 250 PS 637: Performed by: EMERGENCY MEDICINE

## 2019-08-19 RX ORDER — CEPHALEXIN 500 MG/1
500 CAPSULE ORAL 4 TIMES DAILY
Qty: 40 CAPSULE | Refills: 0 | Status: SHIPPED | OUTPATIENT
Start: 2019-08-19 | End: 2019-08-29

## 2019-08-19 RX ORDER — CEPHALEXIN 500 MG/1
500 CAPSULE ORAL ONCE
Status: COMPLETED | OUTPATIENT
Start: 2019-08-19 | End: 2019-08-19

## 2019-08-19 RX ADMIN — CEPHALEXIN 500 MG: 500 CAPSULE ORAL at 11:49

## 2019-08-19 ASSESSMENT — ENCOUNTER SYMPTOMS
MYALGIAS: 1
CHILLS: 0
FATIGUE: 1
FEVER: 0
WOUND: 1

## 2019-08-19 ASSESSMENT — MIFFLIN-ST. JEOR: SCORE: 1835.55

## 2019-08-19 NOTE — ED PROVIDER NOTES
History   Chief Complaint:  Leg Swelling and Redness    HPI   David Santos is a 54 year old male, current smoker who presents with leg swelling and redness. The patient reports that three days ago he was stung several times on his left calf and foot after which he developed swelling to the area. After taking Benadryl, the swelling has improved. Yesterday evening he noticed that he developed redness to the area of his lower leg, prompting him to come to the ED this morning. He does have a larger wound to his left foot as well. The patient also endorses feeling fatigued and has had myalgias in his arms for the past two weeks. He denies fever and chills and has no prior history of cellulitis.    Allergies:  Vicodin    Medications:    Metformin   Aspirin  Insulin glargine  Atorvastatin    Past Medical History:    Type II diabetes mellitus     Past Surgical History:    Eye surgery  Bilateral hand repairs after injury    Family History:    Type II diabetes  MI    Social History:  Smoking status: Current some day smoker  Alcohol use: Yes  Drug use: Yes, marijuana   Marital Status:  Single [1]     Review of Systems   Constitutional: Positive for fatigue. Negative for chills and fever.   Cardiovascular: Positive for leg swelling (left lower leg and foot).   Musculoskeletal: Positive for myalgias (arms bilaterally).   Skin: Positive for rash (redness to left lower leg and foot) and wound (left foot).   All other systems reviewed and are negative.    Physical Exam   Patient Vitals for the past 24 hrs:   BP Temp Temp src Pulse Resp SpO2 Height Weight   08/19/19 1150 (!) 138/98 -- -- 81 -- -- -- --   08/19/19 1126 (!) 152/81 98.4  F (36.9  C) Oral 90 16 98 % 1.829 m (6') 95.3 kg (210 lb)     Physical Exam   Eyes:  The pupils are equal and round    Conjunctivae and sclerae are normal  ENT:    The nose is normal    Pinnae are normal  CV:  Regular rate and rhythm     Mild edema in LLE    Normal DP pulses  bilaterally  Resp:  Lungs are clear    Non-labored    No rales    No wheezing   GI:  Abdomen is soft, there is no rigidity    No distension    No rebound tenderness   MS:  Normal muscular tone    Slight swelling in the left lower leg with surrounding erythema    Multiple rocks in both shoes    Wound lateral to left heel, no drainage  Skin:  Erythema of the left ankle and lower half of lower leg  Neuro:   Awake, alert.      Speech is normal and fluent.    Face is symmetric.     Moves all extremities    Diminished sensation in both lower extremities    Emergency Department Course   Interventions:  1149: Keflex 500 mg PO    Emergency Department Course:  Past medical records, nursing notes, and vitals reviewed.  1130: I performed an exam of the patient as documented above. Clinical findings and plan explained to the patient. Patient discharged home with instructions regarding supportive care, medications, and reasons to return as well as the importance of close follow-up were reviewed.     Impression & Plan    Medical Decision Making:  David Santos is a 54 year old male who presents the emergency department with redness of his left lower extremity.  On exam he does have some mild edema and erythema as well as slight warmth.  I suspect cellulitis has developed after his insect stings.  He is not having any systemic fevers.  He does feel somewhat fatigued.  Also upon removing his shoes and socks there are multiple rocks in his shoes.  He is a diabetic and has likely some developing peripheral neuropathy.  Explained to him it is very important to take care of his feet to make sure there are no rocks or other things in his shoes that might cause him to develop ulcers as he is at high risk of infection.  He verbalized understanding.  At this time there are no signs of an abscess.  He is not systemically ill, so we will treat with Keflex.  He is given his first dose here in the emergency department and a prescription for  home.  Follow-up with primary care provider for recheck within the next week.  Return with any new or concerning symptoms.    Diagnosis:    ICD-10-CM    1. Cellulitis of left lower extremity L03.116        Disposition:  Discharged to home    Discharge Medications:   Details   cephALEXin (KEFLEX) 500 MG capsule Take 1 capsule (500 mg) by mouth 4 times daily for 10 days, Disp-40 capsule, R-0, Local Print       Fabiano Magana  8/19/2019    EMERGENCY DEPARTMENT  IFabiano am serving as a scribe at 11:30 AM on 8/19/2019 to document services personally performed by Akash Becerra MD based on my observations and the provider's statements to me.        Akash Becerra MD  08/19/19 6340

## 2019-08-19 NOTE — ED AVS SNAPSHOT
Emergency Department  [address] [place] [address]  Phone:  [phone]  Fax:  [phone]                                    David Santos   MRN: [id number]    Department:   Emergency Department   Date of Visit:  8/19/2019           After Visit Summary Signature Page    I have received my discharge instructions, and my questions have been answered. I have discussed any challenges I see with this plan with the nurse or doctor.    ..........................................................................................................................................  Patient/Patient Representative Signature      ..........................................................................................................................................  Patient Representative Print Name and Relationship to Patient    ..................................................               ................................................  Date                                   Time    ..........................................................................................................................................  Reviewed by Signature/Title    ...................................................              ..............................................  Date                                               Time          22EPIC Rev 08/18

## 2019-12-13 NOTE — DISCHARGE SUMMARY
[facility]    Discharge Summary  Hospitalist    Date of Admission:  8/22/2017  Date of Discharge:  8/24/2017  Discharging Provider: Maryana Varghese PA-C    Discharge Diagnoses   New onset Diabetes Mellitus   Elevated liver function tests   Tobacco use disorder   Hx substance abuse     History of Present Illness   David Santos is an 52 year old male with a past medical history significant for tobacco use, substance abuse and a history of elevated liver function tests who presented to the Emergency Department for evaluation of weight loss and fatigue. The patient reports that, over the past 3 months, his weight has dropped from 240 pounds to 184 pounds, though his diet has not changed much.  His friends are becoming very concerned about his weight and encouraged him to seek medical attention.  He reports, that over the past 3 months, he has been experiencing fatigue, polyuria and polydipsia.  He also notes bilateral numbness and tingling in his feet. He otherwise denied any recent illness, fevers, night sweats. He had not seen a physician in over 10 years prior to present admission. Blood sugar was found to be marked elevated at 653 on presentation and admission was requested.     On day of discharge the patient is feeling well. He denies any chest pain, shortness of breath, nausea, vomiting, dizziness or abdominal pain. He is comfortable with his current insulin regimen.     Hospital Course   David Santos was admitted on 8/22/2017.  The following problems were addressed during his hospitalization:    1.  New onset type 2 diabetes mellitus.  Patient had not been seen by a medical provider in over 10 years.  He reports 3 months of significant weight loss, fatigue, increased urination and polydipsia.  His blood sugar was elevated on admission at 653 with an elevated A1c of greater than 16.  Notably, his anion gap was within normal limits so he was not in diabetic ketoacidosis.  A long discussion  Authorization obtained from 12/13/2019 through 3/11/2020.  Authorization #[id number]   had with patient regarding the diagnosis of diabetes and initiation of insulin, he is agreeable to try this and agreeable to follow up. Blood sugar morning of discharge improved to 276.   -- discharge on Lantus to 20 units nightly   -- High intensity sliding scale at home  -- 7 units tid with meals  -- Moderate carbohydrate diet.   -- patient has an Endocrinology appointment on 8/25 and PCP appointment  To establish care 8/28  -- discussed symptoms of hypoglycemia, discharged with oral glucose gel      2.  Elevated liver function tests, unclear chronicity.  The patient has not been seen by a medical provider since 2007, though his transaminases were elevated back in 2007.  Repeated and stable through admission. Denies intravenous drug use.   -- abdominal ultrasound unremarkable  -- should be rechecked in follow up, consider hepatitis testing    This patient was seen and examined with Dr. Lr who agrees with the above plan.    Maryana Varghese PA-C    Significant Results and Procedures   See below     Pending Results   These results will be followed up by Endocrinology or PCP   Unresulted Labs Ordered in the Past 30 Days of this Admission     Date and Time Order Name Status Description    8/22/2017 1025 Glutamic acid decarboxylase antibody In process           Code Status   Full Code       Primary Care Physician   Bella Isaac    Physical Exam   Temp: 97.8  F (36.6  C) Temp src: Oral BP: 126/71   Heart Rate: 66 Resp: 16 SpO2: 99 % O2 Device: None (Room air)    Vitals:    08/22/17 1009 08/22/17 1603   Weight: 83.2 kg (183 lb 6.4 oz) 83 kg (182 lb 14.4 oz)     Vital Signs with Ranges  Temp:  [97.8  F (36.6  C)-98.9  F (37.2  C)] 97.8  F (36.6  C)  Heart Rate:  [66] 66  Resp:  [16] 16  BP: (113-126)/(68-74) 126/71  SpO2:  [98 %-99 %] 99 %  I/O last 3 completed shifts:  In: -   Out: 3150 [Urine:3150]    GENERAL:  Alert and oriented male sitting up in bed, appears comfortable and is appropriately conversant,  flat affect.   Eyes:  Pupils are equal and reactive to light, EOMI.   ENT:  Mucous membranes are moist, poor dentition.   CARDIOVASCULAR:  Regular rate and rhythm, no murmurs appreciated.   RESPIRATORY:  Lungs are clear to auscultation bilaterally.  No increased work of breathing or wheezing.   GASTROINTESTINAL:  Positive bowel sounds.  non-distended, non-tender to palpation  SKIN:  Warm and dry.   NEUROLOGIC:  Cranial nerves II-XII are grossly intact.  No focal deficits.      Discharge Disposition   Discharged to home  Condition at discharge: Stable    Consultations This Hospital Stay   CARE COORDINATOR IP CONSULT    Time Spent on this Encounter   I, Maryana Varghese, personally saw the patient today and spent greater than 30 minutes discharging this patient.    Discharge Orders     Diabetes Educator Referral     Reason for your hospital stay   You were here for evaluation of weight loss. Your blood sugar was found to be significantly elevated leading to a new diagnosis of diabetes.     Follow-up and recommended labs and tests    Follow up with Endocrinology (diabetes specialist) 8/25  Establish care with a new primary care provider next week as scheduled  Follow up with diabetes educator next week     Activity   Your activity upon discharge: activity as tolerated     When to contact your care team   Call your primary doctor if you have any of the following: blood sugar >500     Discharge Instructions   Continue the insulin regimen we started in the hospital. It is extremely important that you check your blood sugars four times daily and keep a log of this for your doctors. Check sugars prior to meals.     You will be sent home with glucose gel to be used if your blood sugar drops too low    Please try and adhere to a moderate carbohydrate diet. Avoid simple sugars like candy, baked goods, soda as these can significantly raise your blood sugar     You should not take mealtime insulin unless you are eating      Full Code     Diet   Follow this diet upon discharge: Orders Placed This Encounter     Moderate Consistent CHO Diet       Discharge Medications   Current Discharge Medication List      START taking these medications    Details   !! insulin aspart (NOVOLOG PEN) 100 UNIT/ML injection Inject 1-10 Units Subcutaneous 3 times daily (before meals)  Qty: 3 mL, Refills: 0    Associated Diagnoses: New onset type 2 diabetes mellitus (H)      !! insulin aspart (NOVOLOG PEN) 100 UNIT/ML injection Inject 1-7 Units Subcutaneous At Bedtime  Qty: 3 mL, Refills: 0    Associated Diagnoses: New onset type 2 diabetes mellitus (H)      !! insulin aspart (NOVOLOG PEN) 100 UNIT/ML injection Inject 7 Units Subcutaneous 3 times daily (with meals)  Qty: 3 mL, Refills: 0    Associated Diagnoses: New onset type 2 diabetes mellitus (H)      insulin glargine (LANTUS) 100 UNIT/ML injection Inject 20 Units Subcutaneous At Bedtime  Qty: 3 mL, Refills: 0    Associated Diagnoses: New onset type 2 diabetes mellitus (H)      glucose 40 % GEL gel Take 15-30 g by mouth every 15 minutes as needed for low blood sugar  Qty: 1 Tube, Refills: 0    Associated Diagnoses: New onset type 2 diabetes mellitus (H)      blood glucose monitoring (ACCU-CHEK ARTEMIO PLUS) meter device kit Use to test blood sugars 4 times daily or as directed.  Qty: 1 kit, Refills: 0    Associated Diagnoses: New onset type 2 diabetes mellitus (H)      blood glucose monitoring (NO BRAND SPECIFIED) test strip Use to test blood sugars 4 times daily or as directed  Qty: 100 strip, Refills: 0    Associated Diagnoses: New onset type 2 diabetes mellitus (H)      blood glucose (NO BRAND SPECIFIED) lancets standard Use to test blood sugar 4 times daily or as directed.  Qty: 100 each, Refills: 11    Associated Diagnoses: New onset type 2 diabetes mellitus (H)      blood glucose calibration (NO BRAND SPECIFIED) solution Use to calibrate blood glucose monitor as directed.  Qty: 1 each, Refills: 0     Associated Diagnoses: New onset type 2 diabetes mellitus (H)       !! - Potential duplicate medications found. Please discuss with provider.        Allergies   Allergies   Allergen Reactions     Vicodin [Hydrocodone-Acetaminophen] Nausea and Vomiting     Vomiting and sweats     Data   Most Recent 3 CBC's:  Recent Labs   Lab Test  08/22/17   1025   WBC  8.2   HGB  13.8   MCV  86   PLT  275      Most Recent 3 BMP's:  Recent Labs   Lab Test  08/23/17   0636  08/22/17   2145  08/22/17   1025   NA  136  130*  126*   POTASSIUM  3.6  3.7  4.3   CHLORIDE  103  95  90*   CO2  26  24  24   BUN  7  8  9   CR  0.68  0.62*  0.59*   ANIONGAP  7  11  12   JAMIA  8.0*  7.8*  9.0   GLC  314*  678*  653*     Most Recent 2 LFT's:  Recent Labs   Lab Test  08/23/17   0636  08/22/17   1025   AST  64*  71*   ALT  113*  140*   ALKPHOS  212*  281*   BILITOTAL  0.5  0.5     Most Recent INR's and Anticoagulation Dosing History:  Anticoagulation Dose History     There is no flowsheet data to display.        Most Recent 3 Troponin's:No lab results found.  Most Recent Cholesterol Panel:No lab results found.  Most Recent 6 Bacteria Isolates From Any Culture (See EPIC Reports for Culture Details):No lab results found.  Most Recent TSH, T4 and A1c Labs:  Recent Labs   Lab Test  08/22/17   1025   TSH  0.95   A1C  >16.0*     Results for orders placed or performed during the hospital encounter of 08/22/17   US Abdomen Complete    Narrative    ULTRASOUND  ABDOMEN COMPLETE   8/23/2017 7:58 AM     HISTORY: Elevated liver function test.    COMPARISON: CT 5/30/2007    FINDINGS: Visualized portions of the pancreas, aorta, and IVC are  unremarkable. The liver is normal in size and echogenicity. No biliary  dilatation or liver mass. The gallbladder is normal. No gallstones.  The common bile duct measures 3 mm. Right kidney measures 13.7 cm in  length and appears normal. The spleen is normal in size and  appearance. The left kidney measures 14.1 cm in length.  There is an  exophytic cyst arising from the upper pole of the left kidney that  measures up to 3.9 cm in diameter.      Impression    IMPRESSION: The liver and gallbladder appear normal.    RONNY APODACA MD

## 2019-12-16 PROCEDURE — 96360 HYDRATION IV INFUSION INIT: CPT

## 2019-12-16 PROCEDURE — 96361 HYDRATE IV INFUSION ADD-ON: CPT

## 2019-12-16 PROCEDURE — 99285 EMERGENCY DEPT VISIT HI MDM: CPT | Mod: 25

## 2019-12-16 ASSESSMENT — MIFFLIN-ST. JEOR: SCORE: 1790.19

## 2019-12-17 ENCOUNTER — HOSPITAL ENCOUNTER (EMERGENCY)
Facility: CLINIC | Age: 54
Discharge: ANOTHER HOSPITAL | End: 2019-12-17
Attending: EMERGENCY MEDICINE | Admitting: EMERGENCY MEDICINE
Payer: COMMERCIAL

## 2019-12-17 VITALS
WEIGHT: 200 LBS | OXYGEN SATURATION: 98 % | HEART RATE: 79 BPM | TEMPERATURE: 99.7 F | SYSTOLIC BLOOD PRESSURE: 104 MMHG | HEIGHT: 72 IN | RESPIRATION RATE: 18 BRPM | DIASTOLIC BLOOD PRESSURE: 67 MMHG | BODY MASS INDEX: 27.09 KG/M2

## 2019-12-17 DIAGNOSIS — T33.90XA FROSTBITE, INITIAL ENCOUNTER: ICD-10-CM

## 2019-12-17 LAB
ALBUMIN SERPL-MCNC: 3.9 G/DL (ref 3.4–5)
ALP SERPL-CCNC: 204 U/L (ref 40–150)
ALT SERPL W P-5'-P-CCNC: 83 U/L (ref 0–70)
ANION GAP SERPL CALCULATED.3IONS-SCNC: 6 MMOL/L (ref 3–14)
AST SERPL W P-5'-P-CCNC: 314 U/L (ref 0–45)
BASOPHILS # BLD AUTO: 0 10E9/L (ref 0–0.2)
BASOPHILS NFR BLD AUTO: 0.1 %
BILIRUB SERPL-MCNC: 0.3 MG/DL (ref 0.2–1.3)
BUN SERPL-MCNC: 11 MG/DL (ref 7–30)
CALCIUM SERPL-MCNC: 9.5 MG/DL (ref 8.5–10.1)
CHLORIDE SERPL-SCNC: 103 MMOL/L (ref 94–109)
CO2 BLDCOV-SCNC: 26 MMOL/L (ref 21–28)
CO2 SERPL-SCNC: 29 MMOL/L (ref 20–32)
CREAT SERPL-MCNC: 0.93 MG/DL (ref 0.66–1.25)
DIFFERENTIAL METHOD BLD: NORMAL
EOSINOPHIL # BLD AUTO: 0.2 10E9/L (ref 0–0.7)
EOSINOPHIL NFR BLD AUTO: 1.6 %
ERYTHROCYTE [DISTWIDTH] IN BLOOD BY AUTOMATED COUNT: 12.5 % (ref 10–15)
GFR SERPL CREATININE-BSD FRML MDRD: >90 ML/MIN/{1.73_M2}
GLUCOSE BLDC GLUCOMTR-MCNC: 143 MG/DL (ref 70–99)
GLUCOSE BLDC GLUCOMTR-MCNC: 92 MG/DL (ref 70–99)
GLUCOSE SERPL-MCNC: 47 MG/DL (ref 70–99)
HCT VFR BLD AUTO: 43.6 % (ref 40–53)
HGB BLD-MCNC: 15 G/DL (ref 13.3–17.7)
IMM GRANULOCYTES # BLD: 0 10E9/L (ref 0–0.4)
IMM GRANULOCYTES NFR BLD: 0.4 %
KETONES BLD-SCNC: 0.1 MMOL/L (ref 0–0.6)
LACTATE BLD-SCNC: 3.7 MMOL/L (ref 0.7–2.1)
LYMPHOCYTES # BLD AUTO: 2.2 10E9/L (ref 0.8–5.3)
LYMPHOCYTES NFR BLD AUTO: 20.8 %
MCH RBC QN AUTO: 29.7 PG (ref 26.5–33)
MCHC RBC AUTO-ENTMCNC: 34.4 G/DL (ref 31.5–36.5)
MCV RBC AUTO: 86 FL (ref 78–100)
MONOCYTES # BLD AUTO: 0.8 10E9/L (ref 0–1.3)
MONOCYTES NFR BLD AUTO: 7.3 %
NEUTROPHILS # BLD AUTO: 7.3 10E9/L (ref 1.6–8.3)
NEUTROPHILS NFR BLD AUTO: 69.8 %
PCO2 BLDV: 39 MM HG (ref 40–50)
PH BLDV: 7.44 PH (ref 7.32–7.43)
PLATELET # BLD AUTO: 360 10E9/L (ref 150–450)
PO2 BLDV: 23 MM HG (ref 25–47)
POTASSIUM SERPL-SCNC: 3.5 MMOL/L (ref 3.4–5.3)
PROT SERPL-MCNC: 8.2 G/DL (ref 6.8–8.8)
RBC # BLD AUTO: 5.05 10E12/L (ref 4.4–5.9)
SAO2 % BLDV FROM PO2: 42 %
SODIUM SERPL-SCNC: 138 MMOL/L (ref 133–144)
WBC # BLD AUTO: 10.5 10E9/L (ref 4–11)

## 2019-12-17 PROCEDURE — 82010 KETONE BODYS QUAN: CPT | Performed by: EMERGENCY MEDICINE

## 2019-12-17 PROCEDURE — 85025 COMPLETE CBC W/AUTO DIFF WBC: CPT | Performed by: EMERGENCY MEDICINE

## 2019-12-17 PROCEDURE — 80053 COMPREHEN METABOLIC PANEL: CPT | Performed by: EMERGENCY MEDICINE

## 2019-12-17 PROCEDURE — 83605 ASSAY OF LACTIC ACID: CPT

## 2019-12-17 PROCEDURE — 82803 BLOOD GASES ANY COMBINATION: CPT

## 2019-12-17 PROCEDURE — 00000146 ZZHCL STATISTIC GLUCOSE BY METER IP

## 2019-12-17 PROCEDURE — 25800030 ZZH RX IP 258 OP 636: Performed by: EMERGENCY MEDICINE

## 2019-12-17 RX ADMIN — SODIUM CHLORIDE 1000 ML: 9 INJECTION, SOLUTION INTRAVENOUS at 02:58

## 2019-12-17 ASSESSMENT — ENCOUNTER SYMPTOMS
SHORTNESS OF BREATH: 0
FEVER: 0
ABDOMINAL PAIN: 0

## 2019-12-17 NOTE — ED PROVIDER NOTES
History     Chief Complaint:  Foot Pain    HPI   David Santos is a homeless 54 year old male with a history of insulin-dependent diabetes mellitus type 2 on Glucophage and novolog who presents with foot pain and swelling. The patient reports that he has been walking a lot and started to have bilateral foot swelling and pain. He notes this pain radiated up to his hip today. The patient reports he took his insulin today.    Allergies:  Vicodin    Medications:    Novolog  Glucophage  Basaglar  Admelog    Past Medical History:    Diabetes mellitus type 2    Past Surgical History:    Strabismus surgery (L)  Hand surgery (Bilateral)    Family History:    Diabetes mellitus type 2 (Sister)  Myocardial infarction (Brother)    Social History:  Smoking status: Some Days, 1.00 packs/day  Alcohol use: Yes, history of alcohol abuse  Drug use: No, history of marijuana use and methamphetamine use  PCP: Physician No Ref-Primary  Homeless  Marital Status:  Single [1]    Review of Systems   Constitutional: Negative for fever.   Respiratory: Negative for shortness of breath.    Cardiovascular: Negative for chest pain.   Gastrointestinal: Negative for abdominal pain.   Musculoskeletal:        Foot pain   All other systems reviewed and are negative.      Physical Exam     Patient Vitals for the past 24 hrs:   BP Temp Temp src Pulse Heart Rate Resp SpO2 Height Weight   12/17/19 0452 -- -- -- -- -- -- 99 % -- --   12/17/19 0451 -- -- -- -- -- -- 98 % -- --   12/17/19 0423 -- -- -- -- -- -- 98 % -- --   12/17/19 0415 -- -- -- -- -- -- 97 % -- --   12/17/19 0413 -- -- -- -- -- -- 99 % -- --   12/17/19 0355 -- -- -- -- -- -- 99 % -- --   12/17/19 0354 -- -- -- -- -- -- 99 % -- --   12/17/19 0353 -- -- -- -- -- -- 98 % -- --   12/17/19 0351 -- -- -- -- -- -- 99 % -- --   12/17/19 0302 -- -- -- -- -- -- 96 % -- --   12/17/19 0300 -- -- -- -- -- -- 100 % -- --   12/17/19 0259 -- -- -- -- -- -- 100 % -- --   12/17/19 0142 -- -- -- -- -- -- 99  % -- --   12/17/19 0140 -- -- -- -- -- -- 99 % -- --   12/17/19 0111 -- -- -- -- -- -- 97 % -- --   12/17/19 0110 104/67 -- -- 79 -- -- -- -- --   12/16/19 2302 (!) 144/70 99.7  F (37.6  C) Oral -- 110 18 100 % 1.829 m (6') 90.7 kg (200 lb)     Physical Exam  General: Appears well-developed and well-nourished.   Head: No signs of trauma.   CV: Normal rate and regular rhythm.    Resp: Effort normal and breath sounds normal. No respiratory distress.   GI: Soft. There is no tenderness.  No rebound or guarding.  Normal bowel sounds.    MSK: All toes bilateral feet blue/purple in color and cold.  No bony deformities.    Neuro: The patient is alert and oriented.  Strength in upper/lower extremities normal and symmetrical.   Sensation decreased in toes. Speech normal.  Skin: Skin is warm and dry. See above  Psych: normal mood and affect. behavior is normal.       Emergency Department Course   Laboratory:  CBC: WNL (WBC 10.5, HGB 15.0, )  CMP: Glucose 47 (LL), Alkphos 204 (H), ALT 83 (H),  (H) o/w WNL (Creatinine 0.93)  Ketone Beta-Hydroxybutyrate Dejon: 0.1  ISTAT gases: pH 7.44 (H) 39 (L)/23 (L)/26/42; Lactic Acid (Resulted: 0246): 3.7 (H)   Glucose (Resulted 0234): 47 (LL)  Glucose (Resulted 0252): 92  Glucose (Resulted 0348): 143 (H)    Procedures:  None    Interventions:  0258: NS 1L IV Bolus     Emergency Department Course:  Past medical records, nursing notes, and vitals reviewed.  0142: I performed an exam of the patient and obtained history, as documented above.  IV inserted and blood drawn.    0239: I rechecked the patient. Explained findings to the patient.    0352: I rechecked the patient. Explained findings to the patient.    0436: Findings and plan explained to the Patient.    0506: Patient will be transferred to [facility] via EMS. Discussed the case with Dr. Luna, who will admit the patient to a monitored bed for further monitoring, evaluation, and treatment.     Impression & Plan    CMS Diagnoses:  The Lactic acid level is elevated due to dehydration, at this time there is no sign of severe sepsis or septic shock.    Medical Decision Making:  David Santos is a 54-year-old gentleman presents due to back pain to the bilateral feet.  He is homeless and states he has been walking quite a bit outside.  On my evaluation, his toes were cold and purple with some extension into the midfoot.  Patient also has diabetes and I am concerned for poor circulation secondary to frostbite.  I did initiate rewarming, but his toes continue to be quite purple in color after approximately 2 hours.  At this point, I spoke with [facility], Dr. Luna, who recommended transfer as they may be able to consider TPA in this patient.  Patient was transferred via EMS.  Patient's blood sugar has been noted to be low.  Patient reportedly taken his own insulin just prior.  He was given juice and turkey sandwich and his blood sugar did normalize.    Diagnosis:    ICD-10-CM    1. Frostbite, initial encounter T33.90XA        Disposition:  Transferred to [facility]    Clark Tan  12/16/2019    EMERGENCY DEPARTMENT  Clark SAMS, am serving as a scribe at 1:42 AM on 12/17/2019 to document services personally performed by Fabiano Osorio MD based on my observations and the provider's statements to me.      Fabiano Osorio MD  12/17/19 0572

## 2019-12-17 NOTE — ED NOTES
Adding more warm water gradually to pt's feet per MD request. Pt's feet remain purple in color. MD aware.   
DATE:  12/17/2019   TIME OF RECEIPT FROM LAB:  2:34 AM  LAB TEST:  Blood sugar: 47  LAB VALUE:  See above   RESULTS GIVEN WITH READ-BACK TO (PROVIDER):  Jo   TIME LAB VALUE REPORTED TO PROVIDER:  2:35 AM      
Gradually adding more warm water to pt's feet per MD request.   
Gradually warming pt's feet with warm water. Pt's feet remain purple from mid foot through toes. MD aware and at bedside.   
MD aware of Lactate. Liter bolus hung.   
MD request to warm feet slowly with warm water.   
Pt given courtesy meal and juice for hypoglycemia.   
Report given to [facility] transport. Pt's belongings remain with pt.   
No

## 2019-12-27 ENCOUNTER — RECORDS - HEALTHEAST (OUTPATIENT)
Dept: LAB | Facility: CLINIC | Age: 54
End: 2019-12-27

## 2019-12-30 LAB — 25(OH)D3 SERPL-MCNC: 11.8 NG/ML (ref 30–80)

## 2020-01-23 ENCOUNTER — RECORDS - HEALTHEAST (OUTPATIENT)
Dept: LAB | Facility: CLINIC | Age: 55
End: 2020-01-23

## 2020-01-23 LAB
CHOLEST SERPL-MCNC: 219 MG/DL
FASTING STATUS PATIENT QL REPORTED: ABNORMAL
FASTING STATUS PATIENT QL REPORTED: NORMAL
HDLC SERPL-MCNC: 51 MG/DL
HDLC SERPL-MCNC: 51 MG/DL
LDLC SERPL CALC-MCNC: 142 MG/DL
LDLC SERPL CALC-MCNC: 155 MG/DL
TRIGL SERPL-MCNC: 129 MG/DL

## 2020-01-31 ENCOUNTER — RECORDS - HEALTHEAST (OUTPATIENT)
Dept: LAB | Facility: CLINIC | Age: 55
End: 2020-01-31

## 2020-02-03 LAB — 25(OH)D3 SERPL-MCNC: 43.5 NG/ML (ref 30–80)

## 2020-02-18 ENCOUNTER — RECORDS - HEALTHEAST (OUTPATIENT)
Dept: LAB | Facility: CLINIC | Age: 55
End: 2020-02-18

## 2020-02-18 LAB
25(OH)D3 SERPL-MCNC: 28.3 NG/ML (ref 30–80)
ALBUMIN SERPL-MCNC: 3.9 G/DL (ref 3.5–5)
ALP SERPL-CCNC: 287 U/L (ref 45–120)
ALT SERPL W P-5'-P-CCNC: 65 U/L (ref 0–45)
AST SERPL W P-5'-P-CCNC: 31 U/L (ref 0–40)
BILIRUB DIRECT SERPL-MCNC: 0.1 MG/DL
BILIRUB SERPL-MCNC: 0.3 MG/DL (ref 0–1)
CHOLEST SERPL-MCNC: 251 MG/DL
FASTING STATUS PATIENT QL REPORTED: ABNORMAL
HDLC SERPL-MCNC: 56 MG/DL
LDLC SERPL CALC-MCNC: 169 MG/DL
PROT SERPL-MCNC: 7.4 G/DL (ref 6–8)
TRIGL SERPL-MCNC: 128 MG/DL
TSH SERPL DL<=0.005 MIU/L-ACNC: 3.02 UIU/ML (ref 0.3–5)

## 2020-03-11 ENCOUNTER — RECORDS - HEALTHEAST (OUTPATIENT)
Dept: LAB | Facility: CLINIC | Age: 55
End: 2020-03-11

## 2020-03-11 LAB
ANION GAP SERPL CALCULATED.3IONS-SCNC: 12 MMOL/L (ref 5–18)
BUN SERPL-MCNC: 11 MG/DL (ref 8–22)
CALCIUM SERPL-MCNC: 10.3 MG/DL (ref 8.5–10.5)
CHLORIDE BLD-SCNC: 96 MMOL/L (ref 98–107)
CO2 SERPL-SCNC: 29 MMOL/L (ref 22–31)
CREAT SERPL-MCNC: 0.92 MG/DL (ref 0.7–1.3)
GFR SERPL CREATININE-BSD FRML MDRD: >60 ML/MIN/1.73M2
GLUCOSE BLD-MCNC: 309 MG/DL (ref 70–125)
POTASSIUM BLD-SCNC: 3.8 MMOL/L (ref 3.5–5)
SODIUM SERPL-SCNC: 137 MMOL/L (ref 136–145)

## 2020-05-05 NOTE — PATIENT INSTRUCTIONS
My Diabetes Care Goals:    Monitoring: check before and 2 hours after meals for the next few days.     Increase humalog dose to 5 units before bedtime snack (IF having a bedtime snack).     Take 14 units of Humalog with meals Plus sliding scale based on blood sugar reading before meals.  Will assess if this dose should be adjusted next week with some after meal numbers.     Follow up:  Will call on Friday Nov 10 to review blood sugars. If low blood sugar after meals before Nov 10, please call.     Bring blood glucose meter and logbook with you to all doctor and follow-up appointments.     [facility]:  For Your Diabetes Education and Nutrition Appointments Call:  [phone]   For Diabetes Education or Nutrition Related Questions:   Phone: [phone]  E-mail: [email or web address]  Fax: [phone]   If you need a medication refill please contact your pharmacy. Please allow 3 business days for your refills to be completed.    Instructions for emailing the Diabetes Educators    If you need to communicate a non-urgent message to a Diabetes Educator via email, please send to [email or web address].    Please follow the following email guidelines:    Subject line: Secure: your clinic name (example: Secure: [facility])  In the email please include: First name, middle initial, last name and date of birth.    We will be in touch with you within one (1) business day.      DISPLAY PLAN FREE TEXT

## 2020-10-01 PROBLEM — Z59.00 HOMELESS: Status: ACTIVE | Noted: 2019-12-18

## 2020-10-01 PROBLEM — T34.822A: Status: ACTIVE | Noted: 2020-02-03

## 2020-10-01 PROBLEM — E11.9 TYPE 2 DIABETES MELLITUS WITHOUT COMPLICATION (H): Status: ACTIVE | Noted: 2018-11-02

## 2020-10-01 PROBLEM — T34.822A: Status: RESOLVED | Noted: 2020-02-03 | Resolved: 2020-10-01

## 2020-10-14 ENCOUNTER — RECORDS - HEALTHEAST (OUTPATIENT)
Dept: LAB | Facility: CLINIC | Age: 55
End: 2020-10-14

## 2020-10-15 LAB
ALBUMIN SERPL-MCNC: 4.1 G/DL (ref 3.5–5)
ALP SERPL-CCNC: 169 U/L (ref 45–120)
ALT SERPL W P-5'-P-CCNC: 103 U/L (ref 0–45)
ANION GAP SERPL CALCULATED.3IONS-SCNC: 12 MMOL/L (ref 5–18)
AST SERPL W P-5'-P-CCNC: 45 U/L (ref 0–40)
BASOPHILS # BLD AUTO: 0 THOU/UL (ref 0–0.2)
BASOPHILS NFR BLD AUTO: 0 % (ref 0–2)
BILIRUB SERPL-MCNC: 0.5 MG/DL (ref 0–1)
BUN SERPL-MCNC: 11 MG/DL (ref 8–22)
CALCIUM SERPL-MCNC: 9.2 MG/DL (ref 8.5–10.5)
CHLORIDE BLD-SCNC: 97 MMOL/L (ref 98–107)
CO2 SERPL-SCNC: 23 MMOL/L (ref 22–31)
CREAT SERPL-MCNC: 0.92 MG/DL (ref 0.7–1.3)
EOSINOPHIL # BLD AUTO: 0.2 THOU/UL (ref 0–0.4)
EOSINOPHIL NFR BLD AUTO: 2 % (ref 0–6)
ERYTHROCYTE [DISTWIDTH] IN BLOOD BY AUTOMATED COUNT: 12.7 % (ref 11–14.5)
FOLATE SERPL-MCNC: 14 NG/ML
GFR SERPL CREATININE-BSD FRML MDRD: >60 ML/MIN/1.73M2
GLUCOSE BLD-MCNC: 415 MG/DL (ref 70–125)
HBA1C MFR BLD: 8.9 %
HCT VFR BLD AUTO: 42.8 % (ref 40–54)
HGB BLD-MCNC: 14.4 G/DL (ref 14–18)
IMM GRANULOCYTES # BLD: 0 THOU/UL
IMM GRANULOCYTES NFR BLD: 0 %
LYMPHOCYTES # BLD AUTO: 3.7 THOU/UL (ref 0.8–4.4)
LYMPHOCYTES NFR BLD AUTO: 48 % (ref 20–40)
MCH RBC QN AUTO: 29.9 PG (ref 27–34)
MCHC RBC AUTO-ENTMCNC: 33.6 G/DL (ref 32–36)
MCV RBC AUTO: 89 FL (ref 80–100)
MONOCYTES # BLD AUTO: 0.5 THOU/UL (ref 0–0.9)
MONOCYTES NFR BLD AUTO: 7 % (ref 2–10)
NEUTROPHILS # BLD AUTO: 3.3 THOU/UL (ref 2–7.7)
NEUTROPHILS NFR BLD AUTO: 43 % (ref 50–70)
PLATELET # BLD AUTO: 311 THOU/UL (ref 140–440)
PMV BLD AUTO: 9.4 FL (ref 8.5–12.5)
POTASSIUM BLD-SCNC: 4 MMOL/L (ref 3.5–5)
PROT SERPL-MCNC: 7.6 G/DL (ref 6–8)
RBC # BLD AUTO: 4.81 MILL/UL (ref 4.4–6.2)
SODIUM SERPL-SCNC: 132 MMOL/L (ref 136–145)
VIT B12 SERPL-MCNC: 990 PG/ML (ref 213–816)
WBC: 7.7 THOU/UL (ref 4–11)

## 2020-10-16 LAB
25(OH)D3 SERPL-MCNC: 21.2 NG/ML (ref 30–80)
HCV AB SERPL QL IA: NEGATIVE

## 2020-11-04 ENCOUNTER — RECORDS - HEALTHEAST (OUTPATIENT)
Dept: LAB | Facility: CLINIC | Age: 55
End: 2020-11-04

## 2020-11-06 LAB
ANION GAP SERPL CALCULATED.3IONS-SCNC: 8 MMOL/L (ref 5–18)
BUN SERPL-MCNC: 10 MG/DL (ref 8–22)
CALCIUM SERPL-MCNC: 8.9 MG/DL (ref 8.5–10.5)
CHLORIDE BLD-SCNC: 105 MMOL/L (ref 98–107)
CO2 SERPL-SCNC: 25 MMOL/L (ref 22–31)
CREAT SERPL-MCNC: 0.8 MG/DL (ref 0.7–1.3)
GFR SERPL CREATININE-BSD FRML MDRD: >60 ML/MIN/1.73M2
GLUCOSE BLD-MCNC: 256 MG/DL (ref 70–125)
HIV 1+2 AB+HIV1 P24 AG SERPL QL IA: NEGATIVE
POTASSIUM BLD-SCNC: 3.9 MMOL/L (ref 3.5–5)
SODIUM SERPL-SCNC: 138 MMOL/L (ref 136–145)